# Patient Record
Sex: FEMALE | NOT HISPANIC OR LATINO | Employment: FULL TIME | URBAN - METROPOLITAN AREA
[De-identification: names, ages, dates, MRNs, and addresses within clinical notes are randomized per-mention and may not be internally consistent; named-entity substitution may affect disease eponyms.]

---

## 2017-04-14 LAB
EXTERNAL ABO GROUPING: NORMAL
EXTERNAL ANTIBODY SCREEN: NEGATIVE
EXTERNAL CHLAMYDIA SCREEN: NEGATIVE
EXTERNAL GLUCOSE TOLERANCE TEST FASTING: 105 MG/DL
EXTERNAL GONORRHEA SCREEN: NEGATIVE
EXTERNAL HEPATITIS B SURFACE ANTIGEN: NEGATIVE
EXTERNAL RH FACTOR: POSITIVE
EXTERNAL RUBELLA QUALITATIVE: NORMAL
EXTERNAL SYPHILIS RPR SCREEN: NORMAL
EXTERNAL URINE DRUG SCREEN: NEGATIVE
HIV1 AB SPEC QL IA.RAPID: NEGATIVE

## 2017-08-06 ENCOUNTER — HOSPITAL ENCOUNTER (OUTPATIENT)
Facility: HOSPITAL | Age: 22
Discharge: HOME | End: 2017-08-06
Attending: OBSTETRICS & GYNECOLOGY | Admitting: OBSTETRICS & GYNECOLOGY

## 2017-08-06 VITALS
WEIGHT: 132 LBS | HEART RATE: 106 BPM | SYSTOLIC BLOOD PRESSURE: 123 MMHG | TEMPERATURE: 98.5 F | HEIGHT: 63 IN | BODY MASS INDEX: 23.39 KG/M2 | DIASTOLIC BLOOD PRESSURE: 70 MMHG | RESPIRATION RATE: 20 BRPM

## 2017-08-06 LAB
AMPHET+METHAMPHET UR QL: NEGATIVE
AMPHETAMINES UR QL: NEGATIVE
BACTERIA UR QL AUTO: ABNORMAL /HPF
BARBITURATES UR QL SCN: NEGATIVE
BENZODIAZ UR QL SCN: NEGATIVE
BILIRUB UR QL STRIP: NEGATIVE
BUPRENORPHINE SERPL-MCNC: NEGATIVE NG/ML
CANNABINOIDS SERPL QL: NEGATIVE
CLARITY UR: ABNORMAL
COCAINE UR QL: NEGATIVE
COLOR UR: YELLOW
GLUCOSE UR STRIP-MCNC: NEGATIVE MG/DL
HGB UR QL STRIP.AUTO: NEGATIVE
HYALINE CASTS UR QL AUTO: ABNORMAL /LPF
KETONES UR QL STRIP: ABNORMAL
LEUKOCYTE ESTERASE UR QL STRIP.AUTO: NEGATIVE
METHADONE UR QL SCN: NEGATIVE
NITRITE UR QL STRIP: NEGATIVE
OPIATES UR QL: NEGATIVE
OXYCODONE UR QL SCN: NEGATIVE
PCP UR QL SCN: NEGATIVE
PH UR STRIP.AUTO: 7 [PH] (ref 5–8)
PROPOXYPH UR QL: NEGATIVE
PROT UR QL STRIP: NEGATIVE
RBC # UR: ABNORMAL /HPF
REF LAB TEST METHOD: ABNORMAL
SP GR UR STRIP: 1.01 (ref 1–1.03)
SQUAMOUS #/AREA URNS HPF: ABNORMAL /HPF
TRICYCLICS UR QL SCN: NEGATIVE
UROBILINOGEN UR QL STRIP: ABNORMAL
WBC UR QL AUTO: ABNORMAL /HPF

## 2017-08-06 PROCEDURE — 59025 FETAL NON-STRESS TEST: CPT | Performed by: OBSTETRICS & GYNECOLOGY

## 2017-08-06 PROCEDURE — G0463 HOSPITAL OUTPT CLINIC VISIT: HCPCS

## 2017-08-06 PROCEDURE — 99203 OFFICE O/P NEW LOW 30 MIN: CPT | Performed by: OBSTETRICS & GYNECOLOGY

## 2017-08-06 PROCEDURE — 81001 URINALYSIS AUTO W/SCOPE: CPT | Performed by: OBSTETRICS & GYNECOLOGY

## 2017-08-06 PROCEDURE — 80306 DRUG TEST PRSMV INSTRMNT: CPT | Performed by: OBSTETRICS & GYNECOLOGY

## 2017-08-06 PROCEDURE — 87591 N.GONORRHOEAE DNA AMP PROB: CPT | Performed by: OBSTETRICS & GYNECOLOGY

## 2017-08-06 PROCEDURE — 87491 CHLMYD TRACH DNA AMP PROBE: CPT | Performed by: OBSTETRICS & GYNECOLOGY

## 2017-08-06 PROCEDURE — 59025 FETAL NON-STRESS TEST: CPT

## 2017-08-06 PROCEDURE — 87210 SMEAR WET MOUNT SALINE/INK: CPT | Performed by: OBSTETRICS & GYNECOLOGY

## 2017-08-06 RX ORDER — FERROUS SULFATE 325(65) MG
325 TABLET ORAL
COMMUNITY
End: 2020-05-03 | Stop reason: HOSPADM

## 2017-08-06 RX ORDER — PRENATAL WITH FERROUS FUM AND FOLIC ACID 3080; 920; 120; 400; 22; 1.84; 3; 20; 10; 1; 12; 200; 27; 25; 2 [IU]/1; [IU]/1; MG/1; [IU]/1; MG/1; MG/1; MG/1; MG/1; MG/1; MG/1; UG/1; MG/1; MG/1; MG/1; MG/1
1 TABLET ORAL DAILY
COMMUNITY

## 2017-08-07 NOTE — PROGRESS NOTES
L&D Triage:    CC: abd pain  HPI: 21yo G1 @ 29 5 wks presents to L&D triage with c/o constant bilateral lower abd pain that started this evening after being subject to a family argument which stressed her out. She denies any physical confrontation/trauma. She denies vb/LOF. She admits to active fetal movement. She states the pain is like round ligament pain but more severe. She had a brief episode of chest discomfort and shortness of breath when she was stressed about the argument but it has resolved. She is currently in the process of switching her prenatal care from  to [facility]. She has an appt with Sarah Palafox tomorrow.    OB History    Para Term  AB SAB TAB Ectopic Multiple Living   1               # Outcome Date GA Lbr Malcom/2nd Weight Sex Delivery Anes PTL Lv   1 Current                 Past Medical History:   Diagnosis Date   • Endometriosis      Past Surgical History:   Procedure Laterality Date   • APPENDECTOMY     • WISDOM TOOTH EXTRACTION       Social History     Social History   • Marital status:      Spouse name: N/A   • Number of children: N/A   • Years of education: N/A     Occupational History   • Not on file.     Social History Main Topics   • Smoking status: Never Smoker   • Smokeless tobacco: Not on file   • Alcohol use No   • Drug use: No   • Sexual activity: Not on file     Other Topics Concern   • Not on file     Social History Narrative   • No narrative on file     No family history on file.     Review of Systems - History obtained from the patient  General ROS: negative for - chills or fever  Psychological ROS: negative for - physical abuse  Respiratory ROS: no cough, shortness of breath, or wheezing  Cardiovascular ROS: no chest pain or dyspnea on exertion  Gastrointestinal ROS: positive for - abdominal pain  Genito-Urinary ROS: no dysuria, trouble voiding, or hematuria  Neurological ROS: negative for - headaches or visual changes    Patient Vitals for the past 24  "hrs:   BP Temp Temp src Pulse Resp Height Weight   08/06/17 2150 150/86 - - 108 - - -   08/06/17 2148 - 98.5 °F (36.9 °C) Oral - 20 63\" (160 cm) 132 lb (59.9 kg)       Gen: NAD, WD, WN  Abd: soft, gravid, no focal tenderness  SSE: scant dc in vault. No bleeding, no pooling of fluid  SVE: cl/th/firm/floating    FHT baseline 145, mod bambi, no decels, reactive  [place]: possible very occasional ctx    Wet mount: no clue cells, trich, yeast seen    Imp: 21yo G1 @ 29 5 abd pain             Reassuring fetus  Plan: check UA, rpt BP; GC/CT swab sent to lab.    Otilia Bobo, DO    "

## 2017-08-07 NOTE — PROGRESS NOTES
Pt states her pain is subsiding now.    UA wnl    BPs normalized; initially elevated when pt writhing in bed on admission    D/C home. Keep appt with Sarah Palafox tomorrow.    Otilia Bobo, DO

## 2017-08-08 LAB
C TRACH RRNA SPEC DONR QL NAA+PROBE: NEGATIVE
N GONORRHOEA DNA SPEC QL NAA+PROBE: NEGATIVE

## 2017-09-18 ENCOUNTER — HOSPITAL ENCOUNTER (OUTPATIENT)
Facility: HOSPITAL | Age: 22
Setting detail: OBSERVATION
Discharge: HOME | End: 2017-09-18
Attending: ADVANCED PRACTICE MIDWIFE | Admitting: OBSTETRICS & GYNECOLOGY

## 2017-09-18 VITALS
WEIGHT: 158 LBS | HEIGHT: 63 IN | RESPIRATION RATE: 18 BRPM | BODY MASS INDEX: 28 KG/M2 | SYSTOLIC BLOOD PRESSURE: 134 MMHG | HEART RATE: 66 BPM | DIASTOLIC BLOOD PRESSURE: 83 MMHG | TEMPERATURE: 98 F

## 2017-09-18 PROBLEM — O13.9 GESTATIONAL HYPERTENSION: Status: ACTIVE | Noted: 2017-09-18

## 2017-09-18 LAB
ALP SERPL-CCNC: 132 U/L (ref 25–100)
ALT SERPL W P-5'-P-CCNC: 13 U/L (ref 7–40)
AST SERPL-CCNC: 17 U/L (ref 0–33)
BILIRUB SERPL-MCNC: 0.3 MG/DL (ref 0.3–1.2)
CREAT BLD-MCNC: 0.5 MG/DL (ref 0.6–1.3)
DEPRECATED RDW RBC AUTO: 47.4 FL (ref 37–54)
ERYTHROCYTE [DISTWIDTH] IN BLOOD BY AUTOMATED COUNT: 15.9 % (ref 11.3–14.5)
HCT VFR BLD AUTO: 28.5 % (ref 34.5–44)
HGB BLD-MCNC: 9.1 G/DL (ref 11.5–15.5)
LDH SERPL-CCNC: 147 U/L (ref 120–246)
MCH RBC QN AUTO: 25.9 PG (ref 27–31)
MCHC RBC AUTO-ENTMCNC: 31.9 G/DL (ref 32–36)
MCV RBC AUTO: 81.2 FL (ref 80–99)
PLATELET # BLD AUTO: 163 10*3/MM3 (ref 150–450)
PMV BLD AUTO: 11.7 FL (ref 6–12)
PROT UR STRIP-MCNC: NEGATIVE MG/DL
RBC # BLD AUTO: 3.51 10*6/MM3 (ref 3.89–5.14)
URATE SERPL-MCNC: 4.7 MG/DL (ref 3.1–7.8)
WBC NRBC COR # BLD: 6.3 10*3/MM3 (ref 3.5–10.8)

## 2017-09-18 PROCEDURE — 59025 FETAL NON-STRESS TEST: CPT

## 2017-09-18 PROCEDURE — 82247 BILIRUBIN TOTAL: CPT | Performed by: ADVANCED PRACTICE MIDWIFE

## 2017-09-18 PROCEDURE — 84075 ASSAY ALKALINE PHOSPHATASE: CPT | Performed by: ADVANCED PRACTICE MIDWIFE

## 2017-09-18 PROCEDURE — 84460 ALANINE AMINO (ALT) (SGPT): CPT | Performed by: ADVANCED PRACTICE MIDWIFE

## 2017-09-18 PROCEDURE — 82565 ASSAY OF CREATININE: CPT | Performed by: ADVANCED PRACTICE MIDWIFE

## 2017-09-18 PROCEDURE — 84550 ASSAY OF BLOOD/URIC ACID: CPT | Performed by: ADVANCED PRACTICE MIDWIFE

## 2017-09-18 PROCEDURE — G0378 HOSPITAL OBSERVATION PER HR: HCPCS

## 2017-09-18 PROCEDURE — 81002 URINALYSIS NONAUTO W/O SCOPE: CPT | Performed by: ADVANCED PRACTICE MIDWIFE

## 2017-09-18 PROCEDURE — 85027 COMPLETE CBC AUTOMATED: CPT | Performed by: ADVANCED PRACTICE MIDWIFE

## 2017-09-18 PROCEDURE — 84450 TRANSFERASE (AST) (SGOT): CPT | Performed by: ADVANCED PRACTICE MIDWIFE

## 2017-09-18 PROCEDURE — 83615 LACTATE (LD) (LDH) ENZYME: CPT | Performed by: ADVANCED PRACTICE MIDWIFE

## 2017-09-18 NOTE — H&P
ABHILASH Hammer  Antepartum  H&P    Subjective     Patient:    Presented to the office today after calling to report a BP of 145/99, increased swelling and occasional blurry vision. Denies headache or epigastric pain. Baby active, no contractions    Objective     Vital Signs Range for the last 24 hours  Temp:  [98 °F (36.7 °C)] 98 °F (36.7 °C)   Temp src: Oral   BP: (130-135)/(83-86) 130/86   Heart Rate:  [86-90] 90   Resp:  [18] 18               Weight:  [158 lb (71.7 kg)] 158 lb (71.7 kg)            Physical Exam:  General: Well developed well nourished in no acute distress   Abdomen Abdominal exam: Gravid uterus, soft, non tender, + FM.   Extremities:    Pelvic: Exam of extremities: pedal edema 2 +  DTR's +2  Deferred                               Fetal Heart Rate Assessment   Method:  EFM    Beats/min:  130 BPM   Baseline:  130   Varibility:  average   Accels:  present   Decels:  absent         Uterine Assessment   Method:  [place]   Frequency (min):  ocassional   Ctx Count in 10 min:     Duration:     Intensity: Contraction Intensity: not felt   Intensity by IUPC:     Resting Tone: Uterine Resting Tone: soft by palpation, relaxation >60 sec   Resting Tone by IUPC:     [place] Units:         Assessment/Plan     Active Problems:    Gestational hypertension        Assessment:  1.  Intrauterine pregnancy at 35w6d weeks gestation with reactive fetal status.      Plan:  1.Serial blood pressures  2. CBC, PEP and urine   3 .If BP's  and labs normal will discharge home.  Off work and follow up in office Friday  4. Plan of care has been reviewed with patient         Brigid Palafox CNM  9/18/2017  5:41 PM

## 2017-09-18 NOTE — DISCHARGE INSTR - ACTIVITY
No working this week. Take it easy at home with semi bedrest. No housecleaning, exercise, walking around stores or lesiure strolls.

## 2017-09-19 ENCOUNTER — HOSPITAL ENCOUNTER (OUTPATIENT)
Facility: HOSPITAL | Age: 22
Discharge: HOME | End: 2017-09-20
Attending: OBSTETRICS & GYNECOLOGY | Admitting: OBSTETRICS & GYNECOLOGY

## 2017-09-19 PROCEDURE — 82565 ASSAY OF CREATININE: CPT | Performed by: OBSTETRICS & GYNECOLOGY

## 2017-09-19 PROCEDURE — 86850 RBC ANTIBODY SCREEN: CPT | Performed by: OBSTETRICS & GYNECOLOGY

## 2017-09-19 PROCEDURE — 85027 COMPLETE CBC AUTOMATED: CPT | Performed by: OBSTETRICS & GYNECOLOGY

## 2017-09-19 PROCEDURE — G0463 HOSPITAL OUTPT CLINIC VISIT: HCPCS

## 2017-09-19 PROCEDURE — 82247 BILIRUBIN TOTAL: CPT | Performed by: OBSTETRICS & GYNECOLOGY

## 2017-09-19 PROCEDURE — 59025 FETAL NON-STRESS TEST: CPT

## 2017-09-19 PROCEDURE — 84550 ASSAY OF BLOOD/URIC ACID: CPT | Performed by: OBSTETRICS & GYNECOLOGY

## 2017-09-19 PROCEDURE — 84460 ALANINE AMINO (ALT) (SGPT): CPT | Performed by: OBSTETRICS & GYNECOLOGY

## 2017-09-19 PROCEDURE — 84075 ASSAY ALKALINE PHOSPHATASE: CPT | Performed by: OBSTETRICS & GYNECOLOGY

## 2017-09-19 PROCEDURE — 86901 BLOOD TYPING SEROLOGIC RH(D): CPT | Performed by: OBSTETRICS & GYNECOLOGY

## 2017-09-19 PROCEDURE — 84450 TRANSFERASE (AST) (SGOT): CPT | Performed by: OBSTETRICS & GYNECOLOGY

## 2017-09-19 PROCEDURE — 86900 BLOOD TYPING SEROLOGIC ABO: CPT | Performed by: OBSTETRICS & GYNECOLOGY

## 2017-09-19 PROCEDURE — 83615 LACTATE (LD) (LDH) ENZYME: CPT | Performed by: OBSTETRICS & GYNECOLOGY

## 2017-09-20 VITALS
SYSTOLIC BLOOD PRESSURE: 136 MMHG | HEART RATE: 72 BPM | WEIGHT: 150 LBS | HEIGHT: 63 IN | DIASTOLIC BLOOD PRESSURE: 93 MMHG | BODY MASS INDEX: 26.58 KG/M2

## 2017-09-20 LAB
ABO GROUP BLD: NORMAL
ALP SERPL-CCNC: 137 U/L (ref 25–100)
ALT SERPL W P-5'-P-CCNC: 12 U/L (ref 7–40)
AST SERPL-CCNC: 17 U/L (ref 0–33)
BILIRUB SERPL-MCNC: 0.3 MG/DL (ref 0.3–1.2)
BLD GP AB SCN SERPL QL: NEGATIVE
CREAT BLD-MCNC: 0.5 MG/DL (ref 0.6–1.3)
DEPRECATED RDW RBC AUTO: 48.2 FL (ref 37–54)
ERYTHROCYTE [DISTWIDTH] IN BLOOD BY AUTOMATED COUNT: 16 % (ref 11.3–14.5)
HCT VFR BLD AUTO: 31.1 % (ref 34.5–44)
HGB BLD-MCNC: 9.8 G/DL (ref 11.5–15.5)
LDH SERPL-CCNC: 168 U/L (ref 120–246)
MCH RBC QN AUTO: 25.9 PG (ref 27–31)
MCHC RBC AUTO-ENTMCNC: 31.5 G/DL (ref 32–36)
MCV RBC AUTO: 82.3 FL (ref 80–99)
PLATELET # BLD AUTO: 183 10*3/MM3 (ref 150–450)
PMV BLD AUTO: 12.1 FL (ref 6–12)
RBC # BLD AUTO: 3.78 10*6/MM3 (ref 3.89–5.14)
RH BLD: POSITIVE
URATE SERPL-MCNC: 4.9 MG/DL (ref 3.1–7.8)
WBC NRBC COR # BLD: 7.73 10*3/MM3 (ref 3.5–10.8)

## 2017-09-23 LAB — EXTERNAL GROUP B STREP ANTIGEN: NEGATIVE

## 2017-09-26 ENCOUNTER — TRANSCRIBE ORDERS (OUTPATIENT)
Dept: LAB | Facility: HOSPITAL | Age: 22
End: 2017-09-26

## 2017-09-26 DIAGNOSIS — O14.90: Primary | ICD-10-CM

## 2017-10-06 ENCOUNTER — LAB REQUISITION (OUTPATIENT)
Dept: LAB | Facility: HOSPITAL | Age: 22
End: 2017-10-06

## 2017-10-06 ENCOUNTER — TRANSCRIBE ORDERS (OUTPATIENT)
Dept: LAB | Facility: HOSPITAL | Age: 22
End: 2017-10-06

## 2017-10-06 DIAGNOSIS — Z34.83 PRENATAL CARE, SUBSEQUENT PREGNANCY, THIRD TRIMESTER: ICD-10-CM

## 2017-10-06 DIAGNOSIS — Z3A.38 38 WEEKS GESTATION OF PREGNANCY: Primary | ICD-10-CM

## 2017-10-06 DIAGNOSIS — Z00.00 ROUTINE GENERAL MEDICAL EXAMINATION AT A HEALTH CARE FACILITY: ICD-10-CM

## 2017-10-06 PROCEDURE — 36415 COLL VENOUS BLD VENIPUNCTURE: CPT | Performed by: ADVANCED PRACTICE MIDWIFE

## 2017-10-08 ENCOUNTER — HOSPITAL ENCOUNTER (INPATIENT)
Facility: HOSPITAL | Age: 22
LOS: 4 days | Discharge: HOME | End: 2017-10-12
Attending: ADVANCED PRACTICE MIDWIFE | Admitting: OBSTETRICS & GYNECOLOGY

## 2017-10-08 DIAGNOSIS — O13.3 PREGNANCY-INDUCED HYPERTENSION IN THIRD TRIMESTER: Primary | ICD-10-CM

## 2017-10-08 DIAGNOSIS — N71.9 ENDOMETRITIS: ICD-10-CM

## 2017-10-08 PROBLEM — Z34.90 PREGNANCY: Status: ACTIVE | Noted: 2017-10-08

## 2017-10-08 LAB
ABO GROUP BLD: NORMAL
ALP SERPL-CCNC: 150 U/L (ref 25–100)
ALT SERPL W P-5'-P-CCNC: 12 U/L (ref 7–40)
AST SERPL-CCNC: 19 U/L (ref 0–33)
BILIRUB SERPL-MCNC: 0.3 MG/DL (ref 0.3–1.2)
BLD GP AB SCN SERPL QL: NEGATIVE
CREAT BLD-MCNC: 0.6 MG/DL (ref 0.6–1.3)
DEPRECATED RDW RBC AUTO: 51.9 FL (ref 37–54)
ERYTHROCYTE [DISTWIDTH] IN BLOOD BY AUTOMATED COUNT: 17.1 % (ref 11.3–14.5)
HCT VFR BLD AUTO: 33.1 % (ref 34.5–44)
HGB BLD-MCNC: 10.4 G/DL (ref 11.5–15.5)
LDH SERPL-CCNC: 185 U/L (ref 120–246)
MCH RBC QN AUTO: 26 PG (ref 27–31)
MCHC RBC AUTO-ENTMCNC: 31.4 G/DL (ref 32–36)
MCV RBC AUTO: 82.8 FL (ref 80–99)
PLATELET # BLD AUTO: 177 10*3/MM3 (ref 150–450)
PMV BLD AUTO: 12.2 FL (ref 6–12)
RBC # BLD AUTO: 4 10*6/MM3 (ref 3.89–5.14)
RH BLD: POSITIVE
URATE SERPL-MCNC: 5.5 MG/DL (ref 3.1–7.8)
WBC NRBC COR # BLD: 7.83 10*3/MM3 (ref 3.5–10.8)

## 2017-10-08 PROCEDURE — 86923 COMPATIBILITY TEST ELECTRIC: CPT

## 2017-10-08 PROCEDURE — 86901 BLOOD TYPING SEROLOGIC RH(D): CPT | Performed by: OBSTETRICS & GYNECOLOGY

## 2017-10-08 PROCEDURE — 84450 TRANSFERASE (AST) (SGOT): CPT | Performed by: OBSTETRICS & GYNECOLOGY

## 2017-10-08 PROCEDURE — 84550 ASSAY OF BLOOD/URIC ACID: CPT | Performed by: OBSTETRICS & GYNECOLOGY

## 2017-10-08 PROCEDURE — 84460 ALANINE AMINO (ALT) (SGPT): CPT | Performed by: OBSTETRICS & GYNECOLOGY

## 2017-10-08 PROCEDURE — 59025 FETAL NON-STRESS TEST: CPT

## 2017-10-08 PROCEDURE — 86850 RBC ANTIBODY SCREEN: CPT | Performed by: OBSTETRICS & GYNECOLOGY

## 2017-10-08 PROCEDURE — 0KQM0ZZ REPAIR PERINEUM MUSCLE, OPEN APPROACH: ICD-10-PCS | Performed by: OBSTETRICS & GYNECOLOGY

## 2017-10-08 PROCEDURE — 86900 BLOOD TYPING SEROLOGIC ABO: CPT | Performed by: OBSTETRICS & GYNECOLOGY

## 2017-10-08 PROCEDURE — 82565 ASSAY OF CREATININE: CPT | Performed by: OBSTETRICS & GYNECOLOGY

## 2017-10-08 PROCEDURE — 25010000002 MAGNESIUM SULFATE-LACT RINGERS 40 GM/580ML SOLUTION: Performed by: OBSTETRICS & GYNECOLOGY

## 2017-10-08 PROCEDURE — 83615 LACTATE (LD) (LDH) ENZYME: CPT | Performed by: OBSTETRICS & GYNECOLOGY

## 2017-10-08 PROCEDURE — 85027 COMPLETE CBC AUTOMATED: CPT | Performed by: OBSTETRICS & GYNECOLOGY

## 2017-10-08 PROCEDURE — 84075 ASSAY ALKALINE PHOSPHATASE: CPT | Performed by: OBSTETRICS & GYNECOLOGY

## 2017-10-08 PROCEDURE — 25010000002 BUTORPHANOL PER 1 MG: Performed by: OBSTETRICS & GYNECOLOGY

## 2017-10-08 PROCEDURE — 99221 1ST HOSP IP/OBS SF/LOW 40: CPT | Performed by: OBSTETRICS & GYNECOLOGY

## 2017-10-08 PROCEDURE — 82247 BILIRUBIN TOTAL: CPT | Performed by: OBSTETRICS & GYNECOLOGY

## 2017-10-08 RX ORDER — SODIUM CHLORIDE, SODIUM LACTATE, POTASSIUM CHLORIDE, CALCIUM CHLORIDE 600; 310; 30; 20 MG/100ML; MG/100ML; MG/100ML; MG/100ML
125 INJECTION, SOLUTION INTRAVENOUS CONTINUOUS
Status: DISCONTINUED | OUTPATIENT
Start: 2017-10-08 | End: 2017-10-08

## 2017-10-08 RX ORDER — MAGNESIUM SULF/RINGERS LACTATE 40 G/500ML
2 PLASTIC BAG, INJECTION (ML) INTRAVENOUS CONTINUOUS
Status: DISPENSED | OUTPATIENT
Start: 2017-10-08 | End: 2017-10-09

## 2017-10-08 RX ORDER — SODIUM CHLORIDE, SODIUM LACTATE, POTASSIUM CHLORIDE, CALCIUM CHLORIDE 600; 310; 30; 20 MG/100ML; MG/100ML; MG/100ML; MG/100ML
96 INJECTION, SOLUTION INTRAVENOUS CONTINUOUS
Status: DISCONTINUED | OUTPATIENT
Start: 2017-10-08 | End: 2017-10-12 | Stop reason: HOSPADM

## 2017-10-08 RX ORDER — PROMETHAZINE HYDROCHLORIDE 25 MG/ML
12.5 INJECTION, SOLUTION INTRAMUSCULAR; INTRAVENOUS EVERY 6 HOURS PRN
Status: DISCONTINUED | OUTPATIENT
Start: 2017-10-08 | End: 2017-10-08 | Stop reason: SDUPTHER

## 2017-10-08 RX ORDER — ACETAMINOPHEN 325 MG/1
650 TABLET ORAL EVERY 4 HOURS PRN
Status: DISCONTINUED | OUTPATIENT
Start: 2017-10-08 | End: 2017-10-08 | Stop reason: SDUPTHER

## 2017-10-08 RX ORDER — LIDOCAINE HYDROCHLORIDE 10 MG/ML
5 INJECTION, SOLUTION INFILTRATION; PERINEURAL AS NEEDED
Status: DISCONTINUED | OUTPATIENT
Start: 2017-10-08 | End: 2017-10-09

## 2017-10-08 RX ORDER — PROMETHAZINE HYDROCHLORIDE 12.5 MG/1
12.5 TABLET ORAL EVERY 6 HOURS PRN
Status: DISCONTINUED | OUTPATIENT
Start: 2017-10-08 | End: 2017-10-09 | Stop reason: HOSPADM

## 2017-10-08 RX ORDER — DIPHENHYDRAMINE HYDROCHLORIDE 50 MG/ML
25 INJECTION INTRAMUSCULAR; INTRAVENOUS NIGHTLY PRN
Status: DISCONTINUED | OUTPATIENT
Start: 2017-10-08 | End: 2017-10-08 | Stop reason: SDUPTHER

## 2017-10-08 RX ORDER — ACETAMINOPHEN 325 MG/1
650 TABLET ORAL EVERY 4 HOURS PRN
Status: DISCONTINUED | OUTPATIENT
Start: 2017-10-08 | End: 2017-10-12 | Stop reason: HOSPADM

## 2017-10-08 RX ORDER — PROMETHAZINE HYDROCHLORIDE 25 MG/ML
12.5 INJECTION, SOLUTION INTRAMUSCULAR; INTRAVENOUS EVERY 6 HOURS PRN
Status: DISCONTINUED | OUTPATIENT
Start: 2017-10-08 | End: 2017-10-09 | Stop reason: HOSPADM

## 2017-10-08 RX ORDER — PROMETHAZINE HYDROCHLORIDE 12.5 MG/1
12.5 SUPPOSITORY RECTAL EVERY 6 HOURS PRN
Status: DISCONTINUED | OUTPATIENT
Start: 2017-10-08 | End: 2017-10-09 | Stop reason: HOSPADM

## 2017-10-08 RX ORDER — FAMOTIDINE 10 MG/ML
20 INJECTION, SOLUTION INTRAVENOUS EVERY 12 HOURS SCHEDULED
Status: DISCONTINUED | OUTPATIENT
Start: 2017-10-08 | End: 2017-10-09

## 2017-10-08 RX ORDER — PROMETHAZINE HYDROCHLORIDE 12.5 MG/1
12.5 TABLET ORAL EVERY 6 HOURS PRN
Status: DISCONTINUED | OUTPATIENT
Start: 2017-10-08 | End: 2017-10-08 | Stop reason: SDUPTHER

## 2017-10-08 RX ORDER — IBUPROFEN 600 MG/1
600 TABLET ORAL EVERY 6 HOURS PRN
Status: DISCONTINUED | OUTPATIENT
Start: 2017-10-08 | End: 2017-10-09 | Stop reason: HOSPADM

## 2017-10-08 RX ORDER — MISOPROSTOL 200 UG/1
800 TABLET ORAL AS NEEDED
Status: DISCONTINUED | OUTPATIENT
Start: 2017-10-08 | End: 2017-10-09 | Stop reason: HOSPADM

## 2017-10-08 RX ORDER — LABETALOL HYDROCHLORIDE 5 MG/ML
20-80 INJECTION, SOLUTION INTRAVENOUS
Status: DISPENSED | OUTPATIENT
Start: 2017-10-08 | End: 2017-10-09

## 2017-10-08 RX ORDER — CARBOPROST TROMETHAMINE 250 UG/ML
250 INJECTION, SOLUTION INTRAMUSCULAR AS NEEDED
Status: DISCONTINUED | OUTPATIENT
Start: 2017-10-08 | End: 2017-10-09 | Stop reason: HOSPADM

## 2017-10-08 RX ORDER — ZOLPIDEM TARTRATE 5 MG/1
5 TABLET ORAL NIGHTLY PRN
Status: DISCONTINUED | OUTPATIENT
Start: 2017-10-08 | End: 2017-10-09 | Stop reason: HOSPADM

## 2017-10-08 RX ORDER — METHYLERGONOVINE MALEATE 0.2 MG/ML
200 INJECTION INTRAVENOUS ONCE AS NEEDED
Status: DISCONTINUED | OUTPATIENT
Start: 2017-10-08 | End: 2017-10-09 | Stop reason: HOSPADM

## 2017-10-08 RX ORDER — MORPHINE SULFATE 4 MG/ML
2 INJECTION, SOLUTION INTRAMUSCULAR; INTRAVENOUS
Status: DISCONTINUED | OUTPATIENT
Start: 2017-10-08 | End: 2017-10-09 | Stop reason: HOSPADM

## 2017-10-08 RX ORDER — PROMETHAZINE HYDROCHLORIDE 12.5 MG/1
12.5 SUPPOSITORY RECTAL EVERY 6 HOURS PRN
Status: DISCONTINUED | OUTPATIENT
Start: 2017-10-08 | End: 2017-10-08 | Stop reason: SDUPTHER

## 2017-10-08 RX ORDER — AMOXICILLIN 500 MG/1
500 CAPSULE ORAL 3 TIMES DAILY
Status: ON HOLD | COMMUNITY
Start: 2017-10-06 | End: 2017-10-12

## 2017-10-08 RX ORDER — OXYTOCIN/RINGER'S LACTATE 30/500 ML
2-24 PLASTIC BAG, INJECTION (ML) INTRAVENOUS
Status: DISCONTINUED | OUTPATIENT
Start: 2017-10-08 | End: 2017-10-09

## 2017-10-08 RX ORDER — OXYCODONE HYDROCHLORIDE AND ACETAMINOPHEN 5; 325 MG/1; MG/1
2 TABLET ORAL EVERY 4 HOURS PRN
Status: DISCONTINUED | OUTPATIENT
Start: 2017-10-08 | End: 2017-10-09 | Stop reason: HOSPADM

## 2017-10-08 RX ORDER — DIPHENHYDRAMINE HYDROCHLORIDE 50 MG/ML
25 INJECTION INTRAMUSCULAR; INTRAVENOUS NIGHTLY PRN
Status: DISCONTINUED | OUTPATIENT
Start: 2017-10-08 | End: 2017-10-09 | Stop reason: HOSPADM

## 2017-10-08 RX ORDER — DIPHENHYDRAMINE HCL 25 MG
25 CAPSULE ORAL NIGHTLY PRN
Status: DISCONTINUED | OUTPATIENT
Start: 2017-10-08 | End: 2017-10-08 | Stop reason: SDUPTHER

## 2017-10-08 RX ORDER — ZOLPIDEM TARTRATE 5 MG/1
5 TABLET ORAL NIGHTLY PRN
Status: DISCONTINUED | OUTPATIENT
Start: 2017-10-08 | End: 2017-10-08 | Stop reason: SDUPTHER

## 2017-10-08 RX ORDER — OXYCODONE HYDROCHLORIDE AND ACETAMINOPHEN 5; 325 MG/1; MG/1
1 TABLET ORAL EVERY 4 HOURS PRN
Status: DISCONTINUED | OUTPATIENT
Start: 2017-10-08 | End: 2017-10-09 | Stop reason: HOSPADM

## 2017-10-08 RX ORDER — SODIUM CHLORIDE 0.9 % (FLUSH) 0.9 %
1-10 SYRINGE (ML) INJECTION AS NEEDED
Status: DISCONTINUED | OUTPATIENT
Start: 2017-10-08 | End: 2017-10-09

## 2017-10-08 RX ORDER — DIPHENHYDRAMINE HCL 25 MG
25 CAPSULE ORAL NIGHTLY PRN
Status: DISCONTINUED | OUTPATIENT
Start: 2017-10-08 | End: 2017-10-09 | Stop reason: HOSPADM

## 2017-10-08 RX ADMIN — Medication 2 MILLI-UNITS/MIN: at 13:01

## 2017-10-08 RX ADMIN — BUTORPHANOL TARTRATE 2 MG: 2 INJECTION, SOLUTION INTRAMUSCULAR; INTRAVENOUS at 15:10

## 2017-10-08 RX ADMIN — BUTORPHANOL TARTRATE 2 MG: 2 INJECTION, SOLUTION INTRAMUSCULAR; INTRAVENOUS at 11:34

## 2017-10-08 RX ADMIN — FAMOTIDINE 20 MG: 10 INJECTION, SOLUTION INTRAVENOUS at 05:16

## 2017-10-08 RX ADMIN — LIDOCAINE HYDROCHLORIDE 5 ML: 10 INJECTION, SOLUTION INFILTRATION; PERINEURAL at 21:20

## 2017-10-08 RX ADMIN — BUTORPHANOL TARTRATE 1 MG: 2 INJECTION, SOLUTION INTRAMUSCULAR; INTRAVENOUS at 06:54

## 2017-10-08 RX ADMIN — LABETALOL HYDROCHLORIDE 20 MG: 5 INJECTION INTRAVENOUS at 11:42

## 2017-10-08 RX ADMIN — LABETALOL HYDROCHLORIDE 20 MG: 5 INJECTION INTRAVENOUS at 13:53

## 2017-10-08 RX ADMIN — ACETAMINOPHEN 650 MG: 325 TABLET, FILM COATED ORAL at 22:46

## 2017-10-08 RX ADMIN — MISOPROSTOL 800 MCG: 200 TABLET ORAL at 21:25

## 2017-10-08 RX ADMIN — SODIUM CHLORIDE, POTASSIUM CHLORIDE, SODIUM LACTATE AND CALCIUM CHLORIDE 125 ML/HR: 600; 310; 30; 20 INJECTION, SOLUTION INTRAVENOUS at 11:24

## 2017-10-08 RX ADMIN — SODIUM CHLORIDE, POTASSIUM CHLORIDE, SODIUM LACTATE AND CALCIUM CHLORIDE 125 ML/HR: 600; 310; 30; 20 INJECTION, SOLUTION INTRAVENOUS at 04:00

## 2017-10-08 RX ADMIN — Medication 2 G/HR: at 08:00

## 2017-10-08 RX ADMIN — LABETALOL HYDROCHLORIDE 40 MG: 5 INJECTION INTRAVENOUS at 14:17

## 2017-10-08 NOTE — PROGRESS NOTES
"10/08/17  6:35 PM  Sai Hays    SUBJECTIVE: feeling pushy    ASSESSMENTS:       /77  Pulse 89  Temp 98.3 °F (36.8 °C) (Oral)   Resp 18  Ht 63\" (160 cm)  Wt 158 lb (71.7 kg)  Breastfeeding? Yes  BMI 27.99 kg/m2    Fetal Heart Rate Assessment   Method: Fetal HR Assessment Method: external   Beats/min: Fetal HR (Beats/Min): 115   Baseline: Fetal HR Baseline: normal range (110-160 bpm)   Varibility: Fetal HR Variability: moderate (amplitude range 6 to 25 bpm)   Accels: Fetal HR Accelerations: greater than/equal to10 bpm (32 wks gest or less), lasts at least 10 seconds (32 wks gest or less)   Decels: Fetal HR Decelerations: variable   Tracing Category:       Uterine Assessment   Method: Method: TOCO (external toco transducer)   Frequency (min): Contraction Frequency (min): 3-4   Ctx Count in 10 min:     Duration: Contraction Duration (sec): 60-80   Intensity: Contraction Intensity: strong by palpation   Intensity by IUPC:     Resting Tone: Uterine Resting Tone: soft by palpation, relaxation >60 sec   Resting Tone by IUPC:       Presentation: vetex   Cervix: Exam by: Method: sterile exam per RN   Dilation: Dilation: 9   Effacement: Cervical Effacement: 100%   Station: Station: 1     SROM, clear       PLAN:  1. Magnesium for preeclampsia w/ severe features.  BPs well controlled.  Has required labetalol intermittently.  2.  Pitocin augmentation.    3.  Begin pushing once complete    Madisyn Reed MD  6:35 PM  10/08/17       "

## 2017-10-08 NOTE — PROGRESS NOTES
"10/08/17  12:46 PM  Sai Hays    SUBJECTIVE: feels tired from magnesium.  Coping w/ contractions well.  S/p stadol.     ASSESSMENTS:       /92  Pulse 86  Temp 97.5 °F (36.4 °C) (Oral)   Resp 18  Ht 63\" (160 cm)  Wt 158 lb (71.7 kg)  Breastfeeding? Yes  BMI 27.99 kg/m2    Fetal Heart Rate Assessment   Method: Fetal HR Assessment Method: external   Beats/min: Fetal HR (Beats/Min): 110   Baseline: Fetal HR Baseline: normal range (110-160 bpm)   Varibility: Fetal HR Variability: minimal (detectable: amplitude less than or equal to 5 bpm)   Accels: Fetal HR Accelerations: absent   Decels: Fetal HR Decelerations: absent   Tracing Category:       Uterine Assessment   Method: Method: TOCO (external toco transducer)   Frequency (min): Contraction Frequency (min): 3-5   Ctx Count in 10 min:     Duration: Contraction Duration (sec): 60-80   Intensity: Contraction Intensity: moderate by palpation   Intensity by IUPC:     Resting Tone: Uterine Resting Tone: soft by palpation, relaxation >60 sec   Resting Tone by IUPC:       Presentation: vetex   Cervix: Exam by: Method: sterile exam per RN   Dilation: Dilation: 3   Effacement: Cervical Effacement: 90%   Station: Station: -1     SROM, clear       PLAN:  1. Magnesium for preeclampsia w/ severe features  2.  Pitocin augmentation prn.    3.  Epidural prn    Madisyn Reed MD  12:46 PM  10/08/17       "

## 2017-10-08 NOTE — PLAN OF CARE
Problem: Labor (Cervical Ripen, Induct, Augment) (Adult,Obstetrics,Pediatric)  Goal: Signs and Symptoms of Listed Potential Problems Will be Absent or Manageable (Labor)  Outcome: Ongoing (interventions implemented as appropriate)    10/08/17 0808   Labor (Cervical Ripen, Induct, Augment)   Problems Assessed (Labor) all   Problems Present (Labor) pain

## 2017-10-08 NOTE — H&P
ABHILASH Hammer  Obstetric History and Physical    Chief Complaint   Patient presents with   • Contractions       Subjective     Patient is a 22 y.o. female  currently at 38w5d, who presents with uncomfortable contractions every 2-3 minutes.  She denies leaking and vaginal bleeding.  +FM.  She is noted to have elevated BPs, but denies having any pre-eclampsia related symptoms.  Her PEP is normal.          Prenatal Information:  Prenatal Results         Initial Prenatal Labs Ref. Range Date Time   Hemoglobin       Hematocrit       Platelets  177 10*3/mm3 150 - 450 10*3/mm3 10/08/17 035   Rubella IgG       Hepatitis B SAg ^ Negative   17    Hepatitis C Ab       RPR ^ Non-Reactive   17    ABO  A   10/08/17 0357   Rh  Positive   10/08/17 0357   Antibody Screen ^ Negative   17    HIV       Urine Culture       Gonorrhea  Negative  Negative 17   Chlamydia  Negative  Negative 17   TSH       2nd and 3rd Trimester Ref. Range Date Time   Hemoglobin (repeated)  10.4 g/dL (L) 11.5 - 15.5 g/dL 10/08/17 0357   Hematocrit (repeated)  33.1 % (L) 34.5 - 44.0 % 10/08/17 0357   GCT       Antibody Screen (repeated)  Negative   10/08/17 0357   GTT Fasting       GTT 1 Hr       GTT 2 Hr       GTT 3 Hr       Group B Strep       Drug Screening Ref. Range Date Time   Amphetamine Screen  Negative  Negative 17   Barbiturate Screen  Negative  Negative 17   Benzodiazepine Screen  Negative  Negative 17   Methadone Screen  Negative  Negative 17   Phencyclidine Screen  Negative  Negative 17   Opiates Screen  Negative  Negative 17   THC Screen  Negative  Negative 17   Cocaine Screen  Negative  Negative 17   Propoxyphene Screen  Negative  Negative 17   Buprenorphine Screen  Negative  Negative 17   Methamphetamine Screen  Negative  Negative 17   Oxycodone Screen  Negative  Negative  17 2235   Tryicyclic Antidepressants Screen  Negative  Negative 17 2235   Other (Risk screening) Ref. Range Date Time   Varicella IgG       Parvovirus IgG       CMV IgG       Cystic Fibrosis       Hemoglobin electrophoresis       NIPT       MSAFP-4       AFP (for NTD only)              Legend: ^: Historical            View all results for this pregnancy        External Prenatal Results         Pregnancy Outside Results - these were transcribed from office records.  See scanned records for details. Date Time   Hgb      Hct      ABO ^ A  17    Rh ^ Positive  17    Antibody Screen ^ Negative  17    Glucose Fasting GTT ^ 105 mg/dL 17    Glucose Tolerance Test 1 hour      Glucose Tolerance Test 3 hour      Gonorrhea (discrete) ^ Negative  17    Chlamydia (discrete) ^ Negative  17    RPR ^ Non-Reactive  17    VDRL      Syphillis Antibody      Rubella ^ Non-Immune  17    HBsAg ^ Negative  17    Herpes Simplex Virus PCR      Herpes Simplex VIrus Culture      HIV ^ Negative  17    Hep C RNA Quant PCR      Hep C Antibody      Urine Drug Screen ^ negative  17    AFP      Group B Strep      GBS Susceptibility to Clindamycin      GBS Susceptibility to Eythromycin      Fetal Fibronectin      Genetic Testing, Maternal Blood             Legend: ^: Historical           Past OB History:     Obstetric History       T0      L0     SAB0   TAB0   Ectopic0   Multiple0   Live Births0       # Outcome Date GA Lbr Malcom/2nd Weight Sex Delivery Anes PTL Lv   1 Current                   Past Medical History: Past Medical History:   Diagnosis Date   • Endometriosis    • Gestational hypertension     No medications @ this time      Past Surgical History Past Surgical History:   Procedure Laterality Date   • APPENDECTOMY     • EXPLORATORY LAPAROTOMY     • WISDOM TOOTH EXTRACTION        Family History: No family history on file.   Social History:  reports that  she has never smoked. She has never used smokeless tobacco.   reports that she does not drink alcohol.   reports that she does not use illicit drugs.        Review of Systems   Reviewed in EPIC      Objective     Vital Signs Range for the last 24 hours  Temperature: Temp:  [98.1 °F (36.7 °C)] 98.1 °F (36.7 °C)   Temp Source: Temp src: Oral   BP: BP: (168-179)/() 179/97   Pulse: Heart Rate:  [65-77] 77   Respirations: Resp:  [18] 18   SPO2:     O2 Amount (l/min):     O2 Devices     Weight: Weight:  [158 lb (71.7 kg)] 158 lb (71.7 kg)     Physical Examination: General appearance - oriented to person, place, and time  Chest - no tachypnea, retractions or cyanosis  Heart - normal rate and regular rhythm  Abdomen - soft, nontender, nondistended, no masses or organomegaly  bowel sounds normal  Extremities - non tender        Cervix: Exam by: Method: sterile exam per RN   Dilation: Dilation: 1   Effacement: Cervical Effacement: 70%   Station: Station: -2     Fetal Heart Rate Assessment   Method: Fetal HR Assessment Method: external   Beats/min: Fetal HR (Beats/Min): 125   Baseline: Fetal HR Baseline: normal range (110-160 bpm)   Varibility: Fetal HR Variability: moderate (amplitude range 6 to 25 bpm)   Accels: Fetal HR Accelerations: greater than/equal to 15 bpm, lasting at least 15 seconds   Decels: Fetal HR Decelerations: absent   Tracing Category:       Uterine Assessment   Method: Method: TOCO (external toco transducer)   Frequency (min): Contraction Frequency (min): 2-4   Ctx Count in 10 min:     Duration: Contraction Duration (sec):    Intensity: Contraction Intensity: strong by palpation   Intensity by IUPC:     Resting Tone: Uterine Resting Tone: soft by palpation, relaxation >60 sec   Resting Tone by IUPC:     [place] Units:       Laboratory Results:   Lab Results   Component Value Date     10/08/2017    HGB 10.4 (L) 10/08/2017    HCT 33.1 (L) 10/08/2017    WBC 7.83 10/08/2017     Lab Results    Component Value Date    HGB 10.4 (L) 10/08/2017     10/08/2017    AST 19 10/08/2017    ALT 12 10/08/2017     10/08/2017    URICACID 5.5 10/08/2017    CREATININE 0.60 10/08/2017         Assessment/Plan     Active Problems:    Pregnancy      Assessment & Plan    Assessment:  1.  Intrauterine pregnancy at 38w5d weeks gestation with reassuring fetal status.    2.  Gestation hypertension  3.  Early labour  Plan:  1. Admit  2. CBC, PEP, IV  3. Hold MgSO4 for now, but low threshold for starting if BPs are severe range consistantly  4. Augment as needed.  5. Discussed with Dr. Derek Bal MD  10/8/2017  5:11 AM

## 2017-10-08 NOTE — PLAN OF CARE
Problem: Hypertensive Disorders in Pregnancy (Adult,Obstetrics,Pediatric)  Goal: Signs and Symptoms of Listed Potential Problems Will be Absent or Manageable (Hypertensive Disorders in Pregnancy)  Outcome: Ongoing (interventions implemented as appropriate)    10/08/17 0809   Hypertensive Disorders in Pregnancy   Problems Assessed (Hypertensive Disorders in Pregnancy) all   Problems Present (Hypertensive Disorders in Pregnancy) other (see comments)  (Magnesium added to prevent seizure)

## 2017-10-09 ENCOUNTER — ANESTHESIA EVENT (OUTPATIENT)
Dept: LABOR AND DELIVERY | Facility: HOSPITAL | Age: 22
End: 2017-10-09

## 2017-10-09 ENCOUNTER — ANESTHESIA (OUTPATIENT)
Dept: LABOR AND DELIVERY | Facility: HOSPITAL | Age: 22
End: 2017-10-09

## 2017-10-09 LAB
APTT PPP: 27.6 SECONDS (ref 24–31)
BASOPHILS # BLD AUTO: 0.01 10*3/MM3 (ref 0–0.2)
BASOPHILS NFR BLD AUTO: 0.1 % (ref 0–1)
DEPRECATED RDW RBC AUTO: 49.8 FL (ref 37–54)
DEPRECATED RDW RBC AUTO: 52.2 FL (ref 37–54)
EOSINOPHIL # BLD AUTO: 0.01 10*3/MM3 (ref 0–0.3)
EOSINOPHIL NFR BLD AUTO: 0.1 % (ref 0–3)
ERYTHROCYTE [DISTWIDTH] IN BLOOD BY AUTOMATED COUNT: 16.8 % (ref 11.3–14.5)
ERYTHROCYTE [DISTWIDTH] IN BLOOD BY AUTOMATED COUNT: 17.5 % (ref 11.3–14.5)
FIBRINOGEN PPP-MCNC: 339 MG/DL (ref 200–400)
HCT VFR BLD AUTO: 24.4 % (ref 34.5–44)
HCT VFR BLD AUTO: 24.7 % (ref 34.5–44)
HGB BLD-MCNC: 7.9 G/DL (ref 11.5–15.5)
HGB BLD-MCNC: 8.2 G/DL (ref 11.5–15.5)
IMM GRANULOCYTES # BLD: 0.03 10*3/MM3 (ref 0–0.03)
IMM GRANULOCYTES NFR BLD: 0.2 % (ref 0–0.6)
INR PPP: 0.91
LYMPHOCYTES # BLD AUTO: 1.87 10*3/MM3 (ref 0.6–4.8)
LYMPHOCYTES NFR BLD AUTO: 13.8 % (ref 24–44)
MCH RBC QN AUTO: 26.3 PG (ref 27–31)
MCH RBC QN AUTO: 27.2 PG (ref 27–31)
MCHC RBC AUTO-ENTMCNC: 32.4 G/DL (ref 32–36)
MCHC RBC AUTO-ENTMCNC: 33.2 G/DL (ref 32–36)
MCV RBC AUTO: 81.3 FL (ref 80–99)
MCV RBC AUTO: 82.1 FL (ref 80–99)
MONOCYTES # BLD AUTO: 0.78 10*3/MM3 (ref 0–1)
MONOCYTES NFR BLD AUTO: 5.7 % (ref 0–12)
NEUTROPHILS # BLD AUTO: 10.9 10*3/MM3 (ref 1.5–8.3)
NEUTROPHILS NFR BLD AUTO: 80.1 % (ref 41–71)
PLATELET # BLD AUTO: 161 10*3/MM3 (ref 150–450)
PLATELET # BLD AUTO: 191 10*3/MM3 (ref 150–450)
PMV BLD AUTO: 11.3 FL (ref 6–12)
PMV BLD AUTO: 11.5 FL (ref 6–12)
PROTHROMBIN TIME: 9.9 SECONDS (ref 9.6–11.5)
RBC # BLD AUTO: 3 10*6/MM3 (ref 3.89–5.14)
RBC # BLD AUTO: 3.01 10*6/MM3 (ref 3.89–5.14)
WBC NRBC COR # BLD: 13.6 10*3/MM3 (ref 3.5–10.8)
WBC NRBC COR # BLD: 19.49 10*3/MM3 (ref 3.5–10.8)

## 2017-10-09 PROCEDURE — 85027 COMPLETE CBC AUTOMATED: CPT | Performed by: OBSTETRICS & GYNECOLOGY

## 2017-10-09 PROCEDURE — 25010000002 GENTAMICIN PER 80 MG

## 2017-10-09 PROCEDURE — 25010000002 FENTANYL CITRATE (PF) 100 MCG/2ML SOLUTION: Performed by: ANESTHESIOLOGY

## 2017-10-09 PROCEDURE — 25010000002 MAGNESIUM SULFATE-LACT RINGERS 40 GM/580ML SOLUTION: Performed by: OBSTETRICS & GYNECOLOGY

## 2017-10-09 PROCEDURE — 85025 COMPLETE CBC W/AUTO DIFF WBC: CPT | Performed by: OBSTETRICS & GYNECOLOGY

## 2017-10-09 PROCEDURE — 86900 BLOOD TYPING SEROLOGIC ABO: CPT

## 2017-10-09 PROCEDURE — 10D17Z9 MANUAL EXTRACTION OF PRODUCTS OF CONCEPTION, RETAINED, VIA NATURAL OR ARTIFICIAL OPENING: ICD-10-PCS | Performed by: OBSTETRICS & GYNECOLOGY

## 2017-10-09 PROCEDURE — 85610 PROTHROMBIN TIME: CPT | Performed by: OBSTETRICS & GYNECOLOGY

## 2017-10-09 PROCEDURE — 85384 FIBRINOGEN ACTIVITY: CPT | Performed by: OBSTETRICS & GYNECOLOGY

## 2017-10-09 PROCEDURE — 85730 THROMBOPLASTIN TIME PARTIAL: CPT | Performed by: OBSTETRICS & GYNECOLOGY

## 2017-10-09 PROCEDURE — P9016 RBC LEUKOCYTES REDUCED: HCPCS

## 2017-10-09 PROCEDURE — 25010000002 MIDAZOLAM PER 1 MG: Performed by: ANESTHESIOLOGY

## 2017-10-09 PROCEDURE — 25010000003 CEFAZOLIN IN DEXTROSE 2-4 GM/100ML-% SOLUTION: Performed by: OBSTETRICS & GYNECOLOGY

## 2017-10-09 PROCEDURE — 59025 FETAL NON-STRESS TEST: CPT

## 2017-10-09 PROCEDURE — 36430 TRANSFUSION BLD/BLD COMPNT: CPT

## 2017-10-09 RX ORDER — BUPIVACAINE HYDROCHLORIDE 7.5 MG/ML
INJECTION, SOLUTION INTRASPINAL AS NEEDED
Status: DISCONTINUED | OUTPATIENT
Start: 2017-10-09 | End: 2017-10-09 | Stop reason: SURG

## 2017-10-09 RX ORDER — LANOLIN 100 %
OINTMENT (GRAM) TOPICAL AS NEEDED
Status: DISCONTINUED | OUTPATIENT
Start: 2017-10-09 | End: 2017-10-12 | Stop reason: HOSPADM

## 2017-10-09 RX ORDER — IBUPROFEN 600 MG/1
600 TABLET ORAL EVERY 6 HOURS PRN
Status: DISCONTINUED | OUTPATIENT
Start: 2017-10-09 | End: 2017-10-12 | Stop reason: HOSPADM

## 2017-10-09 RX ORDER — AMOXICILLIN 250 MG/1
500 CAPSULE ORAL 3 TIMES DAILY
Status: DISCONTINUED | OUTPATIENT
Start: 2017-10-09 | End: 2017-10-12 | Stop reason: HOSPADM

## 2017-10-09 RX ORDER — HYDROCODONE BITARTRATE AND ACETAMINOPHEN 5; 325 MG/1; MG/1
1 TABLET ORAL EVERY 4 HOURS PRN
Status: DISCONTINUED | OUTPATIENT
Start: 2017-10-09 | End: 2017-10-12 | Stop reason: HOSPADM

## 2017-10-09 RX ORDER — PRENATAL VIT/IRON FUM/FOLIC AC 27MG-0.8MG
1 TABLET ORAL DAILY
Status: DISCONTINUED | OUTPATIENT
Start: 2017-10-09 | End: 2017-10-12 | Stop reason: HOSPADM

## 2017-10-09 RX ORDER — TRISODIUM CITRATE DIHYDRATE AND CITRIC ACID MONOHYDRATE 500; 334 MG/5ML; MG/5ML
30 SOLUTION ORAL ONCE
Status: COMPLETED | OUTPATIENT
Start: 2017-10-09 | End: 2017-10-09

## 2017-10-09 RX ORDER — ONDANSETRON 4 MG/1
4 TABLET, FILM COATED ORAL EVERY 6 HOURS PRN
Status: DISCONTINUED | OUTPATIENT
Start: 2017-10-09 | End: 2017-10-12 | Stop reason: HOSPADM

## 2017-10-09 RX ORDER — CALCIUM CARBONATE 200(500)MG
2 TABLET,CHEWABLE ORAL 3 TIMES DAILY PRN
Status: DISCONTINUED | OUTPATIENT
Start: 2017-10-09 | End: 2017-10-12 | Stop reason: HOSPADM

## 2017-10-09 RX ORDER — GENTAMICIN SULFATE 100 MG/100ML
2 INJECTION, SOLUTION INTRAVENOUS ONCE
Status: COMPLETED | OUTPATIENT
Start: 2017-10-09 | End: 2017-10-09

## 2017-10-09 RX ORDER — GENTAMICIN SULFATE 80 MG/100ML
1.5 INJECTION, SOLUTION INTRAVENOUS EVERY 8 HOURS
Status: DISCONTINUED | OUTPATIENT
Start: 2017-10-09 | End: 2017-10-10

## 2017-10-09 RX ORDER — DOCUSATE SODIUM 100 MG/1
100 CAPSULE, LIQUID FILLED ORAL 2 TIMES DAILY PRN
Status: DISCONTINUED | OUTPATIENT
Start: 2017-10-09 | End: 2017-10-12 | Stop reason: HOSPADM

## 2017-10-09 RX ORDER — LABETALOL 200 MG/1
100 TABLET, FILM COATED ORAL 3 TIMES DAILY
Status: DISCONTINUED | OUTPATIENT
Start: 2017-10-09 | End: 2017-10-11

## 2017-10-09 RX ORDER — FERROUS SULFATE 325(65) MG
325 TABLET ORAL 2 TIMES DAILY WITH MEALS
Status: DISCONTINUED | OUTPATIENT
Start: 2017-10-09 | End: 2017-10-12 | Stop reason: HOSPADM

## 2017-10-09 RX ORDER — FENTANYL CITRATE 50 UG/ML
INJECTION, SOLUTION INTRAMUSCULAR; INTRAVENOUS AS NEEDED
Status: DISCONTINUED | OUTPATIENT
Start: 2017-10-09 | End: 2017-10-09 | Stop reason: SURG

## 2017-10-09 RX ORDER — ONDANSETRON 2 MG/ML
4 INJECTION INTRAMUSCULAR; INTRAVENOUS EVERY 6 HOURS PRN
Status: DISCONTINUED | OUTPATIENT
Start: 2017-10-09 | End: 2017-10-12 | Stop reason: HOSPADM

## 2017-10-09 RX ORDER — MIDAZOLAM HYDROCHLORIDE 1 MG/ML
INJECTION INTRAMUSCULAR; INTRAVENOUS AS NEEDED
Status: DISCONTINUED | OUTPATIENT
Start: 2017-10-09 | End: 2017-10-09 | Stop reason: SURG

## 2017-10-09 RX ORDER — CEFAZOLIN SODIUM 2 G/100ML
2 INJECTION, SOLUTION INTRAVENOUS ONCE
Status: COMPLETED | OUTPATIENT
Start: 2017-10-09 | End: 2017-10-09

## 2017-10-09 RX ORDER — SODIUM CHLORIDE 0.9 % (FLUSH) 0.9 %
1-10 SYRINGE (ML) INJECTION AS NEEDED
Status: DISCONTINUED | OUTPATIENT
Start: 2017-10-09 | End: 2017-10-12 | Stop reason: HOSPADM

## 2017-10-09 RX ORDER — CLINDAMYCIN PHOSPHATE 900 MG/50ML
900 INJECTION INTRAVENOUS EVERY 8 HOURS
Status: DISCONTINUED | OUTPATIENT
Start: 2017-10-09 | End: 2017-10-10

## 2017-10-09 RX ORDER — BISACODYL 10 MG
10 SUPPOSITORY, RECTAL RECTAL DAILY PRN
Status: DISCONTINUED | OUTPATIENT
Start: 2017-10-09 | End: 2017-10-12 | Stop reason: HOSPADM

## 2017-10-09 RX ADMIN — OXYCODONE AND ACETAMINOPHEN 1 TABLET: 5; 325 TABLET ORAL at 18:32

## 2017-10-09 RX ADMIN — FAMOTIDINE 20 MG: 10 INJECTION, SOLUTION INTRAVENOUS at 09:14

## 2017-10-09 RX ADMIN — GENTAMICIN SULFATE 80 MG: 80 INJECTION, SOLUTION INTRAVENOUS at 13:42

## 2017-10-09 RX ADMIN — FENTANYL CITRATE 20 MCG: 50 INJECTION, SOLUTION INTRAMUSCULAR; INTRAVENOUS at 01:38

## 2017-10-09 RX ADMIN — AMOXICILLIN 500 MG: 250 CAPSULE ORAL at 16:30

## 2017-10-09 RX ADMIN — CARBOPROST TROMETHAMINE 250 MCG: 250 INJECTION, SOLUTION INTRAMUSCULAR at 00:17

## 2017-10-09 RX ADMIN — SODIUM CHLORIDE, POTASSIUM CHLORIDE, SODIUM LACTATE AND CALCIUM CHLORIDE 96 ML/HR: 600; 310; 30; 20 INJECTION, SOLUTION INTRAVENOUS at 23:25

## 2017-10-09 RX ADMIN — SODIUM CITRATE AND CITRIC ACID MONOHYDRATE 30 ML: 500; 334 SOLUTION ORAL at 01:25

## 2017-10-09 RX ADMIN — CLINDAMYCIN PHOSPHATE 900 MG: 18 INJECTION, SOLUTION INTRAVENOUS at 05:26

## 2017-10-09 RX ADMIN — GENTAMICIN SULFATE 100 MG: 100 INJECTION, SOLUTION INTRAVENOUS at 06:29

## 2017-10-09 RX ADMIN — OXYCODONE AND ACETAMINOPHEN 1 TABLET: 5; 325 TABLET ORAL at 12:16

## 2017-10-09 RX ADMIN — Medication 325 MG: at 09:14

## 2017-10-09 RX ADMIN — CLINDAMYCIN PHOSPHATE 900 MG: 18 INJECTION, SOLUTION INTRAVENOUS at 14:41

## 2017-10-09 RX ADMIN — Medication 325 MG: at 18:31

## 2017-10-09 RX ADMIN — CEFAZOLIN SODIUM 2 G: 2 INJECTION, SOLUTION INTRAVENOUS at 01:15

## 2017-10-09 RX ADMIN — LABETALOL HYDROCHLORIDE 100 MG: 200 TABLET, FILM COATED ORAL at 23:25

## 2017-10-09 RX ADMIN — WITCH HAZEL 1 PAD: 500 SOLUTION RECTAL; TOPICAL at 16:13

## 2017-10-09 RX ADMIN — DOCUSATE SODIUM 100 MG: 100 CAPSULE, LIQUID FILLED ORAL at 09:28

## 2017-10-09 RX ADMIN — BUPIVACAINE HYDROCHLORIDE IN DEXTROSE 1.5 MG: 7.5 INJECTION, SOLUTION SUBARACHNOID at 01:38

## 2017-10-09 RX ADMIN — Medication: at 16:13

## 2017-10-09 RX ADMIN — ACETAMINOPHEN 650 MG: 325 TABLET, FILM COATED ORAL at 04:31

## 2017-10-09 RX ADMIN — AMOXICILLIN 500 MG: 250 CAPSULE ORAL at 20:31

## 2017-10-09 RX ADMIN — SODIUM CHLORIDE, POTASSIUM CHLORIDE, SODIUM LACTATE AND CALCIUM CHLORIDE: 600; 310; 30; 20 INJECTION, SOLUTION INTRAVENOUS at 01:43

## 2017-10-09 RX ADMIN — GENTAMICIN SULFATE 80 MG: 80 INJECTION, SOLUTION INTRAVENOUS at 22:32

## 2017-10-09 RX ADMIN — MIDAZOLAM 2 MG: 1 INJECTION INTRAMUSCULAR; INTRAVENOUS at 01:35

## 2017-10-09 RX ADMIN — CLINDAMYCIN PHOSPHATE 900 MG: 18 INJECTION, SOLUTION INTRAVENOUS at 23:35

## 2017-10-09 RX ADMIN — OXYCODONE AND ACETAMINOPHEN 1 TABLET: 5; 325 TABLET ORAL at 09:28

## 2017-10-09 RX ADMIN — Medication 2 G/HR: at 04:47

## 2017-10-09 RX ADMIN — IBUPROFEN 600 MG: 600 TABLET, FILM COATED ORAL at 12:16

## 2017-10-09 RX ADMIN — PRENATAL VIT W/ FE FUMARATE-FA TAB 27-0.8 MG 1 TABLET: 27-0.8 TAB at 09:14

## 2017-10-09 RX ADMIN — IBUPROFEN 600 MG: 600 TABLET, FILM COATED ORAL at 18:31

## 2017-10-09 RX ADMIN — AMOXICILLIN 500 MG: 250 CAPSULE ORAL at 10:31

## 2017-10-09 RX ADMIN — SODIUM CHLORIDE, POTASSIUM CHLORIDE, SODIUM LACTATE AND CALCIUM CHLORIDE 96 ML/HR: 600; 310; 30; 20 INJECTION, SOLUTION INTRAVENOUS at 07:56

## 2017-10-09 NOTE — ANESTHESIA PROCEDURE NOTES
Spinal Block    Start Time: 10/9/2017 1:38 AM  Performed By  Anesthesiologist: AMANDA CHRISTIAN  Preanesthetic Checklist  Completed: patient identified, site marked, surgical consent, pre-op evaluation, timeout performed, IV checked, risks and benefits discussed and monitors and equipment checked  Spinal Block Prep:  Patient Position:sitting  Sterile Tech:cap, gloves and sterile barriers  Prep:alcohol swabs  Patient Monitoring:EKG, continuous pulse oximetry and blood pressure monitoring  Spinal Block Procedure  Approach:midline  Guidance:palpation technique  Location:L2-L3  Needle Type:P[place]  Needle Gauge:25 G  Placement of Spinal needle event:cerebrospinal fluid aspirated  Paresthesia: no  Fluid Appearance:clear  Post Assessment  Patient Tolerance:patient tolerated the procedure well with no apparent complications  Complications no

## 2017-10-09 NOTE — ANESTHESIA POSTPROCEDURE EVALUATION
Patient: Sai Hays    Procedure Summary     Date Anesthesia Start Anesthesia Stop Room / Location    10/09/17          Procedure Diagnosis Scheduled Providers Provider    LABOR ANALGESIA No diagnosis on file.  No responsible provider of record.          Anesthesia Type: No value filed.  Last vitals  BP   134/66 (10/09/17 0736)    Temp   98.8 °F (37.1 °C) (10/09/17 0650)    Pulse   96 (10/09/17 0740)   Resp        SpO2   99 % (10/09/17 0740)      Post Anesthesia Care and Evaluation    Patient location during evaluation: bedside  Patient participation: complete - patient participated  Level of consciousness: awake and alert  Pain management: adequate  Airway patency: patent  Anesthetic complications: No anesthetic complications    Cardiovascular status: acceptable  Respiratory status: acceptable  Hydration status: acceptable  Post Neuraxial Block status: Motor and sensory function returned to baseline and No signs or symptoms of PDPH

## 2017-10-09 NOTE — PROGRESS NOTES
[employer]  Vaginal Delivery Progress Note    Subjective     Doing well, pain controlled, lochia less than menses.  Denies HA, vision change and epigastric pain.        Objective     Vital Signs Range for the last 24 hours  Temperature: Temp:  [97.5 °F (36.4 °C)-100 °F (37.8 °C)] 98.4 °F (36.9 °C)   Temp Source: Temp src: Oral   BP: BP: (100-177)/() 134/66   Pulse: Heart Rate:  [] 97   Respirations: Resp:  [10-20] 16   SPO2: SpO2:  [98 %-100 %] 98 %   O2 Amount (l/min):     O2 Devices           Physical Exam:  General:  no acute distresss.  Abdomen: Soft, non-tender, fundus firm  Extremities: normal, atraumatic, no cyanosis, and trace edema.       Lab results reviewed:  Yes    Lab Results   Component Value Date    WBC 19.49 (H) 10/09/2017    HGB 7.9 (L) 10/09/2017    HCT 24.4 (L) 10/09/2017    MCV 81.3 10/09/2017     10/09/2017         Assessment/Plan     Active Problems:    Pregnancy  Vaginal Delivery  Postpartum hemorrhage  Status post blood transfusion.      Sai Hays is Day 1  post-partum         Plan:  Repeat CBC with diff  Continue magnesium for 24 hours after delivery.   Strict I&Os      Ellen Pires CNM  10/9/2017  9:01 AM

## 2017-10-09 NOTE — ANESTHESIA PREPROCEDURE EVALUATION
Anesthesia Evaluation     Patient summary reviewed and Nursing notes reviewed          Airway   Mallampati: I  TM distance: <3 FB  Neck ROM: full  no difficulty expected  Dental - normal exam     Pulmonary - negative pulmonary ROS and normal exam   Cardiovascular - negative cardio ROS and normal exam        Neuro/Psych- negative ROS  GI/Hepatic/Renal/Endo - negative ROS     Musculoskeletal (-) negative ROS    Abdominal  - normal exam    Bowel sounds: normal.   Substance History - negative use     OB/GYN negative ob/gyn ROS         Other                                        Anesthesia Plan    ASA 3 - emergent     spinal     Anesthetic plan and risks discussed with patient.  Use of blood products discussed with patient .   Plan discussed with attending.

## 2017-10-09 NOTE — PLAN OF CARE
Problem: Patient Care Overview (Adult)  Goal: Plan of Care Review  Outcome: Ongoing (interventions implemented as appropriate)    10/09/17 0426   Coping/Psychosocial Response Interventions   Plan Of Care Reviewed With patient;family   Patient Care Overview   Progress improving       Goal: Adult Individualization and Mutuality  Outcome: Ongoing (interventions implemented as appropriate)    10/09/17 0426   Individualization   Patient Specific Goals Increase H&H   Patient Specific Interventions Blood transfusion    Mutuality/Individual Preferences   What Anxieties, Fears or Concerns Do You Have About Your Health or Care? Issues with breastfeeding    What Questions Do You Have About Your Health or Care? None   What Information Would Help Us Give You More Personalized Care? N/A       Goal: Discharge Needs Assessment  Outcome: Ongoing (interventions implemented as appropriate)    10/09/17 0426   Discharge Needs Assessment   Concerns To Be Addressed no discharge needs identified   Discharge Disposition home or self-care   Current Health   Anticipated Changes Related to Illness none   Self-Care   Equipment Currently Used at Home none   Living Environment   Transportation Available car         Problem: Hypertensive Disorders in Pregnancy (Adult,Obstetrics,Pediatric)  Goal: Signs and Symptoms of Listed Potential Problems Will be Absent or Manageable (Hypertensive Disorders in Pregnancy)  Outcome: Ongoing (interventions implemented as appropriate)    10/09/17 0426   Hypertensive Disorders in Pregnancy   Problems Assessed (Hypertensive Disorders in Pregnancy) all   Problems Present (Hypertensive Disorders in Pregnancy) none         Problem: Postpartum, Vaginal Delivery (Adult)  Goal: Signs and Symptoms of Listed Potential Problems Will be Absent or Manageable (Postpartum, Vaginal Delivery)  Outcome: Ongoing (interventions implemented as appropriate)    10/09/17 0426   Postpartum, Vaginal Delivery   Problems Assessed (Postpartum  Vaginal Delivery) all   Problems Present (Postpartum Vaginal Delivery) hemorrhage

## 2017-10-09 NOTE — L&D DELIVERY NOTE
[facility]  Vaginal Delivery Note    Delivery     Delivery: Vaginal, Spontaneous Delivery     YOB: 2018    Time of Birth: 9:07 PM      Anesthesia: None     Delivering clinician: Madisyn Reed    Forceps?   No   Vacuum? No    Shoulder dystocia present: No        Delivery narrative:  Uncomplicated  over a 2nd laceration.  Anterior shoulder delivered with ease.  Loose nuchal x 1 reduced easily.  Infant vigorous at delivery so delayed cord clamping x 1 min.  Cord doubly clamped and cut.  Laceration repaired in standard fashion using 3-0 vicryl.  Uterine atony noted so 800mcg of cytotec placed rectally.    Infant    Findings: female  infant     Infant observations: Weight: 7 lb 6 oz (3.345 kg)   Length: 20  in  Observations/Comments:         Apgars: 8   @ 1 minute /    9   @ 5 minutes         Placenta, Cord, and Fluid    Placenta delivered  Spontaneous  at   10/8  9:14 PM     Cord: 3 vessels  present.   Nuchal Cord?  yes; Number of nuchal loops present:  1      Cord blood obtained: Yes    Cord gases obtained:  No    Cord gas results: Venous:  No results found for: PHCVEN    Arterial:  No results found for: PHCART     Repair    Episiotomy: None    Lacerations: Yes  Laceration Information  Laceration Repaired?   Perineal: 2nd  Yes    Periurethral:         Labial:         Sulcus:         Vaginal: Yes  Yes    Cervical: No             Estimated Blood Loss:  500mL     Suture used for repair: 3-0 Vicryl and 3-0 Vicryl     Complications  Preeclampsia w/ severe features    Disposition  Mother to Remain in LD  in stable condition currently.  Baby to remains with mom  in stable condition currently.      Madisyn Reed MD  10/08/17  9:43 PM

## 2017-10-09 NOTE — PROGRESS NOTES
Called  To the room to evaluate post partum bleeding  She delivered vaginally with a second degree and vaginal wall laceration requiring repair.  EBL for the delivery was 500 mL.  When getting up, she had additional bleeding including large blood clots that is estimated to be an   Additional 600 mL.  Her bladder as emptied via straight cath and she received Hemabate.   A bedside USN shows that she still has quite a   Bit of clot in her uterus and possibly RPOC.      Will draw CBC, Fibrinogen, and Coags  2 units of blood on hold  Will take to O.R. For exam under anesthesia and possible D&C  Spoke with Dr. Reed to update her - she is en route

## 2017-10-09 NOTE — PROGRESS NOTES
Component      Latest Ref Rng & Units 10/8/2017 10/9/2017   WBC      3.50 - 10.80 10*3/mm3 7.83 19.49 (H)   RBC      3.89 - 5.14 10*6/mm3 4.00 3.00 (L)   Hemoglobin      11.5 - 15.5 g/dL 10.4 (L) 7.9 (L)   Hematocrit      34.5 - 44.0 % 33.1 (L) 24.4 (L)   MCV      80.0 - 99.0 fL 82.8 81.3   MCH      27.0 - 31.0 pg 26.0 (L) 26.3 (L)   MCHC      32.0 - 36.0 g/dL 31.4 (L) 32.4   RDW      11.3 - 14.5 % 17.1 (H) 17.5 (H)   RDW-SD      37.0 - 54.0 fl 51.9 52.2     MPV      6.0 - 12.0 fL 12.2 (H) 11.5   Platelets      150 - 450 10*3/mm3 177 191   Protime      9.6 - 11.5 Seconds  9.9   INR        0.91   Fibrinogen      200 - 400 mg/dL  339   aPTT      24.0 - 31.0 seconds  27.6     Significant drop in H&H post delivery and passage of 600cc of clot.  Discussed proceeding with blood transfusion due to additional expected blood loss at time of D&C and current tachycardia.  Discussed risks, benefits, and alternatives.  Patient accepts risks and wishes to proceed with transfusion.  Will transfuse 2 units of PRBCs and repeat H&C 4h after complete.    Madisyn Reed MD  1:11 AM

## 2017-10-09 NOTE — OP NOTE
Dilation and Curettage Procedure Note        Pre-operative Diagnosis: postpartum hemorrhage     Post-operative Diagnosis: same, retained membranes     Operation: manual evacuation of blood clot, sharp curettage, placement of Bakri balloon     Surgeon: Madisyn Reed MD      Assistants: Dr. Bal     Anesthesia: spinal anesthesia     Findings: distended uterus filled with 600mL of clot which was evacuated.  Boggy lower uterine segment following evacuation     Estimated Blood Loss: 800mL       Specimens: none     Complications:  None     Disposition: PACU - hemodynamically stable.     Procedure Details    The patient was seen in the LDR. The risks, benefits, complications, treatment options, and expected outcomes were discussed with the patient. The patient concurred with the proposed plan, giving informed consent. The patient was identified as Sai Hays  and the procedure verified as D&C. A Time Out was held and the above information confirmed.     After confirmation of anesthesia, the patient was draped and prepped in the usual sterile manner. An exam under anesthesia was performed. 600mL of clot was removed from the fundus of the uterus.  Bimanual massage was performed.  The lower uterine segment remained boggy.  A banjo curette was then used to perform a sharp D&C under ultrasound guidance.  A small amount of placental tissue and membranes were removed.  Bimanual massage was again performed.  Due to the boggy lower uterine segment, a bakri balloon was placed.  The balloon was filled with 180mL of NS.  A sterile way catheter was inserted.  Hemostasis was noted.  The patient was taken out of the candy cane stirrups and transferred to PACU in good condition.  The counts were correct x 2.     Madisyn Reed MD  2:12 AM

## 2017-10-09 NOTE — ANESTHESIA POSTPROCEDURE EVALUATION
Patient: Sai Hays    Procedure Summary     Date Anesthesia Start Anesthesia Stop Room / Location    10/09/17 0133 0216  [facility] /  [facility]       Procedure Diagnosis Surgeon Provider    DILATATION AND CURETTAGE (N/A Vagina) No diagnosis on file. MD Hiram Bustillo MD          Anesthesia Type: spinal  Last vitals  /59      Temp     98.5   Pulse    93   Resp 16       SpO2     100 on 2 liters o2     Post Anesthesia Care and Evaluation    Patient location during evaluation: bedside  Patient participation: complete - patient participated  Level of consciousness: lethargic and awake  Pain score: 0  Pain management: adequate  Airway patency: patent  Anesthetic complications: No anesthetic complications    Cardiovascular status: acceptable  Respiratory status: acceptable  Hydration status: acceptable

## 2017-10-09 NOTE — NURSING NOTE
"2007- Dr. Reed called- pt. Has anterior lip and extreme urge to push. Dr. Reed said to recheck the patient in 15 minutes.   2016- Patient pushing \"involuntarily\"   2017- RN rechecked patients cervix, patient still had anterior lip but still had extreme urge to push. Dr. Reed was called and notified- Dr. Reed says she is on her way to the hospital   2030- Dr. Reed at bedside  2033- Patient complete and pushing   2125- Cytotec given by Dr. Reed     2246- patient ambulated to restroom with help of RN and her mother. Patient was unable to void. Patient cleaned up, pads and panties applied.     2340- RN performed a fundal check, patient still had moderate amount of bleeding. Patient stated she would try to void.   2345- pt. Ambulated to bathroom, patient stated she felt lightheaded, RN helped guide her to sit down onto the toilet. Patient and RN heard a gush. RN looked and saw multiple blood clots and blood. RN called out for help to get patient back to the bed. RN called for Dr. Redmond.   2355- Dr. Redmond at bedside, Dr. Redmond used a hand held ultrasound and saw multiple clots in her uterus. Dr. Redmond ordered labs, and hemabate.   0000- Fundal massage applied   0030- Dr. Redmond and Dr. Reed decided to go back to the OR to do a D & C. Dr. Reed also ordered 2 units of PRBC due to decrease in H &H   0042- 500 mL of Lactate ringers bolus given   0100- Derek at bedside to talk about procedure with patient and family.   0130- in OR  0146- Dr. Reed performed straight in/out catheterization; unknown amount of output   0156- Bakri inserted; inflated with 180 CCs with Normal Saline     0430- After 15 minutes of second unit of PRBC- temperature rechecked and it was 100.0. Dr. Redmond notified. Tylenol given. He is on his way to assess the situation.   0435- Blood restarted per Dr. Redmond order  "

## 2017-10-10 LAB
ABO + RH BLD: NORMAL
ABO + RH BLD: NORMAL
BH BB BLOOD EXPIRATION DATE: NORMAL
BH BB BLOOD EXPIRATION DATE: NORMAL
BH BB BLOOD TYPE BARCODE: 6200
BH BB BLOOD TYPE BARCODE: 6200
BH BB DISPENSE STATUS: NORMAL
BH BB DISPENSE STATUS: NORMAL
BH BB PRODUCT CODE: NORMAL
BH BB PRODUCT CODE: NORMAL
BH BB UNIT NUMBER: NORMAL
BH BB UNIT NUMBER: NORMAL
UNIT  ABO: NORMAL
UNIT  ABO: NORMAL
UNIT  RH: NORMAL
UNIT  RH: NORMAL

## 2017-10-10 PROCEDURE — 25010000002 GENTAMICIN PER 80 MG

## 2017-10-10 RX ADMIN — PRENATAL VIT W/ FE FUMARATE-FA TAB 27-0.8 MG 1 TABLET: 27-0.8 TAB at 09:03

## 2017-10-10 RX ADMIN — AMOXICILLIN 500 MG: 250 CAPSULE ORAL at 17:12

## 2017-10-10 RX ADMIN — GENTAMICIN SULFATE 80 MG: 80 INJECTION, SOLUTION INTRAVENOUS at 06:53

## 2017-10-10 RX ADMIN — DOCUSATE SODIUM 100 MG: 100 CAPSULE, LIQUID FILLED ORAL at 09:04

## 2017-10-10 RX ADMIN — AMOXICILLIN 500 MG: 250 CAPSULE ORAL at 21:50

## 2017-10-10 RX ADMIN — DOCUSATE SODIUM 100 MG: 100 CAPSULE, LIQUID FILLED ORAL at 17:12

## 2017-10-10 RX ADMIN — HYDROCODONE BITARTRATE AND ACETAMINOPHEN 1 TABLET: 5; 325 TABLET ORAL at 06:20

## 2017-10-10 RX ADMIN — Medication 325 MG: at 09:03

## 2017-10-10 RX ADMIN — LABETALOL HYDROCHLORIDE 100 MG: 200 TABLET, FILM COATED ORAL at 09:03

## 2017-10-10 RX ADMIN — AMOXICILLIN 500 MG: 250 CAPSULE ORAL at 09:08

## 2017-10-10 RX ADMIN — IBUPROFEN 600 MG: 600 TABLET, FILM COATED ORAL at 06:20

## 2017-10-10 RX ADMIN — LABETALOL HYDROCHLORIDE 100 MG: 200 TABLET, FILM COATED ORAL at 17:12

## 2017-10-10 RX ADMIN — LABETALOL HYDROCHLORIDE 100 MG: 200 TABLET, FILM COATED ORAL at 21:50

## 2017-10-10 RX ADMIN — CLINDAMYCIN PHOSPHATE 900 MG: 18 INJECTION, SOLUTION INTRAVENOUS at 06:52

## 2017-10-10 RX ADMIN — Medication 325 MG: at 17:12

## 2017-10-10 NOTE — PROGRESS NOTES
10/10/2017  PPD #2    Subjective   Sai feels well.  Patient describes her lochia as less than menses.  Pain is well controlled       Objective   Temp: Temp:  [97.5 °F (36.4 °C)-99.2 °F (37.3 °C)] 97.9 °F (36.6 °C) Temp src: Oral   BP: BP: (111-155)/(66-97) 122/72        Pulse: Heart Rate:  [] 83  RR: Resp:  [14-20] 14    General:  No acute distress   Abdomen: Fundus firm and beneath umbilicus   Pelvis: deferred     Lab Results   Component Value Date    WBC 13.60 (H) 10/09/2017    HGB 8.2 (L) 10/09/2017    HCT 24.7 (L) 10/09/2017    MCV 82.1 10/09/2017     10/09/2017    HEPBSAG Negative 04/14/2017       Assessment  1. PPD# 2 after vaginal delivery   2. Post-partum Hemorrhage  3. Post-op D/C  4. Pre-eclampsia    Plan  1. Discontinue IV  2. Discontinue IV antibiodics  3. Ok to shower  4. Monitor blood pressure today with orals, if they remain stable possibly discharge tonight but most likely discharge in am.       This note has been electronically signed.    Kaylie Pinzon,   October 10, 2017

## 2017-10-10 NOTE — LACTATION NOTE
"This note was copied from a baby's chart.     10/10/17 0925   Maternal Information   Date of Referral 10/10/17   Person Making Referral other (see comments)  (courtesy)   Maternal Reason for Referral breastfeeding currently   Maternal Infant Assessment   Size Issue, Bilateral Breasts no   Shape, Bilateral Breasts round   Density, Bilateral Breasts soft   Nipples, Bilateral graspable   Nipple Condition, Left intact   Nipple Conditions, Right abraded  (abraded on tip of nipple)   Nipple Conditions, Bilateral tender   Infant Assessment   Sucking Reflex present   Rooting Reflex present   Swallow Reflex present   LATCH Score   Latch 2-->grasps breast, tongue down, lips flanged, rhythmic sucking   Audible Swallowing 2-->spontaneous and intermittent (24 hrs old)   Type Of Nipple 2-->everted (after stimulation)   Comfort (Breast/Nipple) 1-->filling, red/small blisters/bruises, mild/mod discomfort   Hold (Positioning) 1-->minimal assist, teach one side: mother does other, staff holds   Score (less than 7 for 2/more consecutive times, consult Lactation Consultant) 8   Maternal Infant Feeding   Maternal Emotional State anxious   Previous Breastfeeding History no   Infant Positioning cross-cradle;clutch/\"football\"   Signs of Milk Transfer audible swallow   Comfort Measures Before/During Feeding infant position adjusted;latch adjusted;maternal position adjusted   Nipple Shape After Feeding, Left Breast round;symmetrical;appropriately projected   Nipple Shape After Feeding, Right Breast round;symmetrical;appropriately projected   Latch Assistance yes   Current Delivery Breastfeeding History   Currently Breastfeeding yes   Feeding Infant   Feeding Readiness Cues rooting   Effective Latch During Feeding yes   Audible Swallow yes   Suck/Swallow Coordination present   Skin-to-Skin Contact During Feeding yes   Equipment Type/Education   Breast Pump Type other (see comments)  (Rx given)   Additional Equipment hydrogel pads  (encouraged " d/c paci use, mom has shield but doesn't use)

## 2017-10-10 NOTE — PLAN OF CARE
Problem: Patient Care Overview (Adult)  Goal: Plan of Care Review  Outcome: Ongoing (interventions implemented as appropriate)    10/10/17 1010   Coping/Psychosocial Response Interventions   Plan Of Care Reviewed With patient   Patient Care Overview   Progress improving       Goal: Adult Individualization and Mutuality  Outcome: Ongoing (interventions implemented as appropriate)  Goal: Discharge Needs Assessment  Outcome: Ongoing (interventions implemented as appropriate)    Problem: Hypertensive Disorders in Pregnancy (Adult,Obstetrics,Pediatric)  Goal: Signs and Symptoms of Listed Potential Problems Will be Absent or Manageable (Hypertensive Disorders in Pregnancy)  Outcome: Ongoing (interventions implemented as appropriate)    Problem: Postpartum, Vaginal Delivery (Adult)  Goal: Signs and Symptoms of Listed Potential Problems Will be Absent or Manageable (Postpartum, Vaginal Delivery)  Outcome: Ongoing (interventions implemented as appropriate)    Problem: Breastfeeding (Adult,NICU,,Obstetrics,Pediatric)  Goal: Signs and Symptoms of Listed Potential Problems Will be Absent or Manageable (Breastfeeding)  Outcome: Ongoing (interventions implemented as appropriate)

## 2017-10-10 NOTE — PLAN OF CARE
Problem: Patient Care Overview (Adult)  Goal: Plan of Care Review  Outcome: Ongoing (interventions implemented as appropriate)    Problem: Breastfeeding (Adult,NICU,[place],Obstetrics,Pediatric)  Goal: Signs and Symptoms of Listed Potential Problems Will be Absent or Manageable (Breastfeeding)  Outcome: Ongoing (interventions implemented as appropriate)

## 2017-10-11 ENCOUNTER — RESULTS ENCOUNTER (OUTPATIENT)
Dept: LAB | Facility: HOSPITAL | Age: 22
End: 2017-10-11

## 2017-10-11 DIAGNOSIS — Z34.83 PRENATAL CARE, SUBSEQUENT PREGNANCY, THIRD TRIMESTER: ICD-10-CM

## 2017-10-11 DIAGNOSIS — Z3A.38 38 WEEKS GESTATION OF PREGNANCY: ICD-10-CM

## 2017-10-11 RX ORDER — LABETALOL 200 MG/1
200 TABLET, FILM COATED ORAL EVERY 8 HOURS SCHEDULED
Status: DISCONTINUED | OUTPATIENT
Start: 2017-10-11 | End: 2017-10-12 | Stop reason: HOSPADM

## 2017-10-11 RX ORDER — NIFEDIPINE 30 MG/1
30 TABLET, EXTENDED RELEASE ORAL EVERY 12 HOURS SCHEDULED
Status: DISCONTINUED | OUTPATIENT
Start: 2017-10-11 | End: 2017-10-12 | Stop reason: HOSPADM

## 2017-10-11 RX ADMIN — AMOXICILLIN 500 MG: 250 CAPSULE ORAL at 21:35

## 2017-10-11 RX ADMIN — LABETALOL HYDROCHLORIDE 200 MG: 200 TABLET, FILM COATED ORAL at 17:00

## 2017-10-11 RX ADMIN — Medication 325 MG: at 17:00

## 2017-10-11 RX ADMIN — DOCUSATE SODIUM 100 MG: 100 CAPSULE, LIQUID FILLED ORAL at 09:12

## 2017-10-11 RX ADMIN — PRENATAL VIT W/ FE FUMARATE-FA TAB 27-0.8 MG 1 TABLET: 27-0.8 TAB at 09:12

## 2017-10-11 RX ADMIN — Medication 325 MG: at 09:12

## 2017-10-11 RX ADMIN — ACETAMINOPHEN 650 MG: 325 TABLET, FILM COATED ORAL at 17:02

## 2017-10-11 RX ADMIN — AMOXICILLIN 500 MG: 250 CAPSULE ORAL at 16:59

## 2017-10-11 RX ADMIN — DOCUSATE SODIUM 100 MG: 100 CAPSULE, LIQUID FILLED ORAL at 17:00

## 2017-10-11 RX ADMIN — NIFEDIPINE 30 MG: 30 TABLET, FILM COATED, EXTENDED RELEASE ORAL at 21:35

## 2017-10-11 RX ADMIN — NIFEDIPINE 30 MG: 30 TABLET, FILM COATED, EXTENDED RELEASE ORAL at 09:12

## 2017-10-11 RX ADMIN — LABETALOL HYDROCHLORIDE 100 MG: 200 TABLET, FILM COATED ORAL at 09:12

## 2017-10-11 RX ADMIN — LABETALOL HYDROCHLORIDE 200 MG: 200 TABLET, FILM COATED ORAL at 21:34

## 2017-10-11 RX ADMIN — AMOXICILLIN 500 MG: 250 CAPSULE ORAL at 09:11

## 2017-10-11 NOTE — PLAN OF CARE
Problem: Patient Care Overview (Adult)  Goal: Plan of Care Review  Outcome: Ongoing (interventions implemented as appropriate)    10/11/17 3390   Coping/Psychosocial Response Interventions   Plan Of Care Reviewed With patient   Patient Care Overview   Progress improving       Goal: Adult Individualization and Mutuality  Outcome: Ongoing (interventions implemented as appropriate)  Goal: Discharge Needs Assessment  Outcome: Ongoing (interventions implemented as appropriate)    Problem: Hypertensive Disorders in Pregnancy (Adult,Obstetrics,Pediatric)  Goal: Signs and Symptoms of Listed Potential Problems Will be Absent or Manageable (Hypertensive Disorders in Pregnancy)  Outcome: Ongoing (interventions implemented as appropriate)    Problem: Postpartum, Vaginal Delivery (Adult)  Goal: Signs and Symptoms of Listed Potential Problems Will be Absent or Manageable (Postpartum, Vaginal Delivery)  Outcome: Ongoing (interventions implemented as appropriate)    Problem: Breastfeeding (Adult,NICU,,Obstetrics,Pediatric)  Goal: Signs and Symptoms of Listed Potential Problems Will be Absent or Manageable (Breastfeeding)  Outcome: Ongoing (interventions implemented as appropriate)

## 2017-10-11 NOTE — PROGRESS NOTES
10/11/2017  PPD #3    Subjective   Sai feels well.  Patient describes her lochia as less than menses.  Pain is well controlled       Objective   Temp: Temp:  [98.5 °F (36.9 °C)-99.2 °F (37.3 °C)] 98.5 °F (36.9 °C) Temp src: Oral   BP: BP: (131-159)/(63-91) 135/91        Pulse: Heart Rate:  [80-88] 80  RR: Resp:  [16-18] 18    General:  No acute distress   Abdomen: Fundus firm and beneath umbilicus   Pelvis: deferred     Lab Results   Component Value Date    WBC 13.60 (H) 10/09/2017    HGB 8.2 (L) 10/09/2017    HCT 24.7 (L) 10/09/2017    MCV 82.1 10/09/2017     10/09/2017    HEPBSAG Negative 04/14/2017       Assessment  1. PPD# 3 after vaginal delivery   2. Post-partum Hemorrhage  3. Post-op D&C  4. Pre-eclampsia w/ severe features    Plan  1. Add nifedipine XL 30mg po BID for elevated BPs.  Cont labetalol 100mg po TID  2. If BPs well controlled throughout the day, will d/c late afternoon  3. BP check in office on Fri.  Has cuff at home and will check TID      This note has been electronically signed.    Madisyn Reed MD  October 11, 2017       Addendum:  BPs still elevated.  Increase labetalol to 200mg po TID.  Keep inpatient     Madisyn Reed MD  3:20 PM

## 2017-10-12 ENCOUNTER — HOSPITAL ENCOUNTER (OUTPATIENT)
Dept: LABOR AND DELIVERY | Facility: HOSPITAL | Age: 22
Discharge: HOME | End: 2017-10-12

## 2017-10-12 VITALS
DIASTOLIC BLOOD PRESSURE: 73 MMHG | WEIGHT: 158 LBS | HEIGHT: 63 IN | RESPIRATION RATE: 18 BRPM | HEART RATE: 86 BPM | BODY MASS INDEX: 28 KG/M2 | OXYGEN SATURATION: 99 % | TEMPERATURE: 98.5 F | SYSTOLIC BLOOD PRESSURE: 130 MMHG

## 2017-10-12 PROBLEM — Z34.90 PREGNANCY: Status: RESOLVED | Noted: 2017-10-08 | Resolved: 2017-10-12

## 2017-10-12 RX ORDER — PYRAZINAMIDE 500 MG/1
1 TABLET ORAL EVERY 4 HOURS PRN
Qty: 15 TABLET | Refills: 0 | Status: SHIPPED | OUTPATIENT
Start: 2017-10-12 | End: 2018-10-12

## 2017-10-12 RX ORDER — LABETALOL 200 MG/1
200 TABLET, FILM COATED ORAL 3 TIMES DAILY
Qty: 120 TABLET | Refills: 1 | Status: SHIPPED | OUTPATIENT
Start: 2017-10-12 | End: 2020-05-03 | Stop reason: HOSPADM

## 2017-10-12 RX ORDER — IBUPROFEN 600 MG/1
600 TABLET ORAL EVERY 6 HOURS PRN
Qty: 30 TABLET | Refills: 1 | Status: ON HOLD | OUTPATIENT
Start: 2017-10-12 | End: 2020-05-03 | Stop reason: SDUPTHER

## 2017-10-12 RX ORDER — NIFEDIPINE 30 MG/1
30 TABLET, FILM COATED, EXTENDED RELEASE ORAL 2 TIMES DAILY
Qty: 60 TABLET | Refills: 3 | Status: SHIPPED | OUTPATIENT
Start: 2017-10-12 | End: 2020-05-03 | Stop reason: HOSPADM

## 2017-10-12 RX ORDER — AMOXICILLIN 500 MG/1
500 CAPSULE ORAL 2 TIMES DAILY
Qty: 14 CAPSULE | Refills: 0 | Status: SHIPPED | OUTPATIENT
Start: 2017-10-12 | End: 2020-05-03 | Stop reason: HOSPADM

## 2017-10-12 RX ADMIN — PRENATAL VIT W/ FE FUMARATE-FA TAB 27-0.8 MG 1 TABLET: 27-0.8 TAB at 08:15

## 2017-10-12 RX ADMIN — NIFEDIPINE 30 MG: 30 TABLET, FILM COATED, EXTENDED RELEASE ORAL at 08:14

## 2017-10-12 RX ADMIN — LABETALOL HYDROCHLORIDE 200 MG: 200 TABLET, FILM COATED ORAL at 05:32

## 2017-10-12 RX ADMIN — AMOXICILLIN 500 MG: 250 CAPSULE ORAL at 08:14

## 2017-10-12 RX ADMIN — Medication 325 MG: at 08:15

## 2017-10-12 NOTE — PLAN OF CARE
Problem: Patient Care Overview (Adult)  Goal: Plan of Care Review  Outcome: Outcome(s) achieved Date Met:  10/12/17    10/12/17 0802   Outcome Evaluation   Outcome Summary/Follow up Plan VSS. FUNDUS FIRM, LIGHT LOCHIA. PAIN CONTROLLED.       Goal: Adult Individualization and Mutuality  Outcome: Outcome(s) achieved Date Met:  10/12/17  Goal: Discharge Needs Assessment  Outcome: Outcome(s) achieved Date Met:  10/12/17    Problem: Hypertensive Disorders in Pregnancy (Adult,Obstetrics,Pediatric)  Goal: Signs and Symptoms of Listed Potential Problems Will be Absent or Manageable (Hypertensive Disorders in Pregnancy)  Outcome: Outcome(s) achieved Date Met:  10/12/17    Problem: Postpartum, Vaginal Delivery (Adult)  Goal: Signs and Symptoms of Listed Potential Problems Will be Absent or Manageable (Postpartum, Vaginal Delivery)  Outcome: Outcome(s) achieved Date Met:  10/12/17    Problem: Breastfeeding (Adult,NICU,,Obstetrics,Pediatric)  Goal: Signs and Symptoms of Listed Potential Problems Will be Absent or Manageable (Breastfeeding)  Outcome: Outcome(s) achieved Date Met:  10/12/17

## 2017-10-12 NOTE — DISCHARGE SUMMARY
[facility]   Discharge Summary      Patient: Sai Hays      MR#:[id number]  Admission  Diagnosis: Pregnancy  Discharge Diagnosis: Same    Date of Admission: 10/8/2017  Date of Discharge:  10/12/2017    Procedures:  Vaginal, Spontaneous Delivery     10/8/2017    9:07 PM      Service:  Obstetrics    Hospital Course:  Patient underwent vaginal delivery and remained in the hospital for 4 days.  During that time she remained afebrile and hemodynamically stable.  On the day of discharge, she was eating, ambulating and voiding without difficulty.  Her hospital stay was complicated by Pre-eclampsia and Post-partum hemorrhage that required blood transfusion and an emergent D & C.    Lab Results   Component Value Date    WBC 13.60 (H) 10/09/2017    HGB 8.2 (L) 10/09/2017    HCT 24.7 (L) 10/09/2017    MCV 82.1 10/09/2017     10/09/2017    URICACID 5.5 10/08/2017    AST 19 10/08/2017    ALT 12 10/08/2017     10/08/2017       Results from last 7 days  Lab Units 10/08/17  0357   ABO TYPING  A   RH TYPING  Positive   ANTIBODY SCREEN  Negative       Discharge Medications   Sai Hays   Home Medication Instructions STAS:[id number]    Printed on:10/12/17 0929   Medication Information                      amoxicillin (AMOXIL) 500 MG capsule  Take 500 mg by mouth 3 (Three) Times a Day.             ferrous sulfate 325 (65 FE) MG tablet  Take 325 mg by mouth Daily With Breakfast.             Prenatal Vit-Fe Fumarate-FA (PRENATAL 27-1) 27-1 MG tablet tablet  Take 1 tablet by mouth Daily.             Labatelol 200mg TID  Procardia 30mg XL BID  Amoxacillion 500 mg BID    Discharge Disposition:  To Home    Discharge Condition:  Stable    Discharge Diet: regular    Activity at Discharge: Pelvic rest    Follow-up Appointments  No future appointments.      Kaylie Pinzon DO  10/12/17  9:29 AM

## 2019-09-16 ENCOUNTER — LAB REQUISITION (OUTPATIENT)
Dept: LAB | Facility: HOSPITAL | Age: 24
End: 2019-09-16

## 2019-09-16 DIAGNOSIS — Z34.81 ENCOUNTER FOR SUPERVISION OF OTHER NORMAL PREGNANCY, FIRST TRIMESTER: ICD-10-CM

## 2019-12-27 ENCOUNTER — LAB REQUISITION (OUTPATIENT)
Dept: LAB | Facility: HOSPITAL | Age: 24
End: 2019-12-27

## 2019-12-27 DIAGNOSIS — Z00.00 ROUTINE GENERAL MEDICAL EXAMINATION AT A HEALTH CARE FACILITY: ICD-10-CM

## 2019-12-27 PROCEDURE — 36415 COLL VENOUS BLD VENIPUNCTURE: CPT | Performed by: ADVANCED PRACTICE MIDWIFE

## 2020-01-27 ENCOUNTER — LAB REQUISITION (OUTPATIENT)
Dept: LAB | Facility: HOSPITAL | Age: 25
End: 2020-01-27

## 2020-01-27 DIAGNOSIS — Z00.00 ROUTINE GENERAL MEDICAL EXAMINATION AT A HEALTH CARE FACILITY: ICD-10-CM

## 2020-01-27 PROCEDURE — 36415 COLL VENOUS BLD VENIPUNCTURE: CPT | Performed by: ADVANCED PRACTICE MIDWIFE

## 2020-04-17 ENCOUNTER — LAB REQUISITION (OUTPATIENT)
Dept: LAB | Facility: HOSPITAL | Age: 25
End: 2020-04-17

## 2020-04-17 DIAGNOSIS — Z00.00 ROUTINE GENERAL MEDICAL EXAMINATION AT A HEALTH CARE FACILITY: ICD-10-CM

## 2020-04-17 PROCEDURE — 36415 COLL VENOUS BLD VENIPUNCTURE: CPT | Performed by: NURSE PRACTITIONER

## 2020-05-01 ENCOUNTER — ANESTHESIA EVENT (OUTPATIENT)
Dept: LABOR AND DELIVERY | Facility: HOSPITAL | Age: 25
End: 2020-05-01

## 2020-05-01 ENCOUNTER — HOSPITAL ENCOUNTER (INPATIENT)
Facility: HOSPITAL | Age: 25
LOS: 2 days | Discharge: HOME | End: 2020-05-03
Attending: ADVANCED PRACTICE MIDWIFE | Admitting: OBSTETRICS & GYNECOLOGY

## 2020-05-01 ENCOUNTER — ANESTHESIA (OUTPATIENT)
Dept: LABOR AND DELIVERY | Facility: HOSPITAL | Age: 25
End: 2020-05-01

## 2020-05-01 PROBLEM — Z37.9 NORMAL LABOR: Status: RESOLVED | Noted: 2020-05-01 | Resolved: 2020-05-01

## 2020-05-01 PROBLEM — Z37.9 NORMAL LABOR: Status: ACTIVE | Noted: 2020-05-01

## 2020-05-01 LAB
ABO GROUP BLD: NORMAL
ALP SERPL-CCNC: 143 U/L (ref 39–117)
ALT SERPL W P-5'-P-CCNC: 13 U/L (ref 1–33)
AST SERPL-CCNC: 33 U/L (ref 1–32)
BILIRUB SERPL-MCNC: 0.2 MG/DL (ref 0.2–1.2)
BLD GP AB SCN SERPL QL: NEGATIVE
CREAT BLD-MCNC: 0.78 MG/DL (ref 0.57–1)
DEPRECATED RDW RBC AUTO: 45.7 FL (ref 37–54)
ERYTHROCYTE [DISTWIDTH] IN BLOOD BY AUTOMATED COUNT: 15.3 % (ref 12.3–15.4)
HCT VFR BLD AUTO: 38.9 % (ref 34–46.6)
HGB BLD-MCNC: 12.1 G/DL (ref 12–15.9)
LDH SERPL-CCNC: 402 U/L (ref 135–214)
MCH RBC QN AUTO: 26.2 PG (ref 26.6–33)
MCHC RBC AUTO-ENTMCNC: 31.1 G/DL (ref 31.5–35.7)
MCV RBC AUTO: 84.4 FL (ref 79–97)
PLATELET # BLD AUTO: 163 10*3/MM3 (ref 140–450)
PMV BLD AUTO: 13 FL (ref 6–12)
RBC # BLD AUTO: 4.61 10*6/MM3 (ref 3.77–5.28)
RH BLD: POSITIVE
T&S EXPIRATION DATE: NORMAL
URATE SERPL-MCNC: 7.4 MG/DL (ref 2.4–5.7)
WBC NRBC COR # BLD: 10.43 10*3/MM3 (ref 3.4–10.8)

## 2020-05-01 PROCEDURE — 86900 BLOOD TYPING SEROLOGIC ABO: CPT | Performed by: OBSTETRICS & GYNECOLOGY

## 2020-05-01 PROCEDURE — 25010000002 FENTANYL CITRATE (PF) 100 MCG/2ML SOLUTION: Performed by: NURSE ANESTHETIST, CERTIFIED REGISTERED

## 2020-05-01 PROCEDURE — 84075 ASSAY ALKALINE PHOSPHATASE: CPT | Performed by: OBSTETRICS & GYNECOLOGY

## 2020-05-01 PROCEDURE — 10907ZC DRAINAGE OF AMNIOTIC FLUID, THERAPEUTIC FROM PRODUCTS OF CONCEPTION, VIA NATURAL OR ARTIFICIAL OPENING: ICD-10-PCS | Performed by: ADVANCED PRACTICE MIDWIFE

## 2020-05-01 PROCEDURE — 83615 LACTATE (LD) (LDH) ENZYME: CPT | Performed by: OBSTETRICS & GYNECOLOGY

## 2020-05-01 PROCEDURE — 82565 ASSAY OF CREATININE: CPT | Performed by: OBSTETRICS & GYNECOLOGY

## 2020-05-01 PROCEDURE — C1755 CATHETER, INTRASPINAL: HCPCS | Performed by: ANESTHESIOLOGY

## 2020-05-01 PROCEDURE — 86850 RBC ANTIBODY SCREEN: CPT | Performed by: OBSTETRICS & GYNECOLOGY

## 2020-05-01 PROCEDURE — 25010000002 ROPIVACAINE PER 1 MG: Performed by: NURSE ANESTHETIST, CERTIFIED REGISTERED

## 2020-05-01 PROCEDURE — 84450 TRANSFERASE (AST) (SGOT): CPT | Performed by: OBSTETRICS & GYNECOLOGY

## 2020-05-01 PROCEDURE — 84550 ASSAY OF BLOOD/URIC ACID: CPT | Performed by: OBSTETRICS & GYNECOLOGY

## 2020-05-01 PROCEDURE — 86901 BLOOD TYPING SEROLOGIC RH(D): CPT | Performed by: OBSTETRICS & GYNECOLOGY

## 2020-05-01 PROCEDURE — 25010000003 MAGNESIUM SULFATE 20 GM/500ML SOLUTION: Performed by: ADVANCED PRACTICE MIDWIFE

## 2020-05-01 PROCEDURE — 84460 ALANINE AMINO (ALT) (SGPT): CPT | Performed by: OBSTETRICS & GYNECOLOGY

## 2020-05-01 PROCEDURE — 51702 INSERT TEMP BLADDER CATH: CPT

## 2020-05-01 PROCEDURE — C1755 CATHETER, INTRASPINAL: HCPCS

## 2020-05-01 PROCEDURE — 82247 BILIRUBIN TOTAL: CPT | Performed by: OBSTETRICS & GYNECOLOGY

## 2020-05-01 PROCEDURE — 85027 COMPLETE CBC AUTOMATED: CPT | Performed by: OBSTETRICS & GYNECOLOGY

## 2020-05-01 RX ORDER — PRENATAL VIT/IRON FUM/FOLIC AC 27MG-0.8MG
1 TABLET ORAL DAILY
Status: DISCONTINUED | OUTPATIENT
Start: 2020-05-02 | End: 2020-05-02

## 2020-05-01 RX ORDER — PROMETHAZINE HYDROCHLORIDE 12.5 MG/1
12.5 TABLET ORAL EVERY 6 HOURS PRN
Status: DISCONTINUED | OUTPATIENT
Start: 2020-05-01 | End: 2020-05-01 | Stop reason: HOSPADM

## 2020-05-01 RX ORDER — SODIUM CHLORIDE, SODIUM LACTATE, POTASSIUM CHLORIDE, CALCIUM CHLORIDE 600; 310; 30; 20 MG/100ML; MG/100ML; MG/100ML; MG/100ML
125 INJECTION, SOLUTION INTRAVENOUS CONTINUOUS
Status: DISCONTINUED | OUTPATIENT
Start: 2020-05-01 | End: 2020-05-03 | Stop reason: HOSPADM

## 2020-05-01 RX ORDER — CARBOPROST TROMETHAMINE 250 UG/ML
250 INJECTION, SOLUTION INTRAMUSCULAR AS NEEDED
Status: DISCONTINUED | OUTPATIENT
Start: 2020-05-01 | End: 2020-05-01 | Stop reason: HOSPADM

## 2020-05-01 RX ORDER — EPHEDRINE SULFATE/0.9% NACL/PF 25 MG/5 ML
10 SYRINGE (ML) INTRAVENOUS
Status: DISCONTINUED | OUTPATIENT
Start: 2020-05-01 | End: 2020-05-01 | Stop reason: HOSPADM

## 2020-05-01 RX ORDER — FENTANYL CITRATE 50 UG/ML
INJECTION, SOLUTION INTRAMUSCULAR; INTRAVENOUS AS NEEDED
Status: DISCONTINUED | OUTPATIENT
Start: 2020-05-01 | End: 2020-05-01 | Stop reason: SURG

## 2020-05-01 RX ORDER — ONDANSETRON 2 MG/ML
4 INJECTION INTRAMUSCULAR; INTRAVENOUS EVERY 6 HOURS PRN
Status: DISCONTINUED | OUTPATIENT
Start: 2020-05-01 | End: 2020-05-01

## 2020-05-01 RX ORDER — ONDANSETRON 2 MG/ML
4 INJECTION INTRAMUSCULAR; INTRAVENOUS ONCE AS NEEDED
Status: DISCONTINUED | OUTPATIENT
Start: 2020-05-01 | End: 2020-05-01 | Stop reason: HOSPADM

## 2020-05-01 RX ORDER — LIDOCAINE HYDROCHLORIDE 10 MG/ML
5 INJECTION, SOLUTION EPIDURAL; INFILTRATION; INTRACAUDAL; PERINEURAL AS NEEDED
Status: DISCONTINUED | OUTPATIENT
Start: 2020-05-01 | End: 2020-05-01 | Stop reason: HOSPADM

## 2020-05-01 RX ORDER — MAGNESIUM CARB/ALUMINUM HYDROX 105-160MG
30 TABLET,CHEWABLE ORAL ONCE
Status: DISCONTINUED | OUTPATIENT
Start: 2020-05-01 | End: 2020-05-01 | Stop reason: HOSPADM

## 2020-05-01 RX ORDER — LANOLIN
CREAM (ML) TOPICAL
Status: DISCONTINUED | OUTPATIENT
Start: 2020-05-01 | End: 2020-05-03 | Stop reason: HOSPADM

## 2020-05-01 RX ORDER — OXYTOCIN-SODIUM CHLORIDE 0.9% IV SOLN 30 UNIT/500ML 30-0.9/5 UT/ML-%
650 SOLUTION INTRAVENOUS ONCE
Status: COMPLETED | OUTPATIENT
Start: 2020-05-01 | End: 2020-05-01

## 2020-05-01 RX ORDER — ZOLPIDEM TARTRATE 5 MG/1
5 TABLET ORAL NIGHTLY PRN
Status: DISCONTINUED | OUTPATIENT
Start: 2020-05-01 | End: 2020-05-01 | Stop reason: HOSPADM

## 2020-05-01 RX ORDER — DIPHENHYDRAMINE HYDROCHLORIDE 50 MG/ML
12.5 INJECTION INTRAMUSCULAR; INTRAVENOUS EVERY 8 HOURS PRN
Status: DISCONTINUED | OUTPATIENT
Start: 2020-05-01 | End: 2020-05-01 | Stop reason: HOSPADM

## 2020-05-01 RX ORDER — PROMETHAZINE HYDROCHLORIDE 25 MG/ML
12.5 INJECTION, SOLUTION INTRAMUSCULAR; INTRAVENOUS EVERY 6 HOURS PRN
Status: DISCONTINUED | OUTPATIENT
Start: 2020-05-01 | End: 2020-05-01 | Stop reason: HOSPADM

## 2020-05-01 RX ORDER — IBUPROFEN 600 MG/1
600 TABLET ORAL EVERY 6 HOURS PRN
Status: DISCONTINUED | OUTPATIENT
Start: 2020-05-01 | End: 2020-05-01 | Stop reason: HOSPADM

## 2020-05-01 RX ORDER — HYDROCORTISONE 25 MG/G
1 CREAM TOPICAL AS NEEDED
Status: DISCONTINUED | OUTPATIENT
Start: 2020-05-01 | End: 2020-05-03 | Stop reason: HOSPADM

## 2020-05-01 RX ORDER — PROMETHAZINE HYDROCHLORIDE 12.5 MG/1
12.5 SUPPOSITORY RECTAL EVERY 6 HOURS PRN
Status: DISCONTINUED | OUTPATIENT
Start: 2020-05-01 | End: 2020-05-01 | Stop reason: HOSPADM

## 2020-05-01 RX ORDER — DOCUSATE SODIUM 100 MG/1
100 CAPSULE, LIQUID FILLED ORAL 2 TIMES DAILY
Status: DISCONTINUED | OUTPATIENT
Start: 2020-05-02 | End: 2020-05-03 | Stop reason: HOSPADM

## 2020-05-01 RX ORDER — ONDANSETRON 4 MG/1
4 TABLET, FILM COATED ORAL EVERY 6 HOURS PRN
Status: DISCONTINUED | OUTPATIENT
Start: 2020-05-01 | End: 2020-05-01 | Stop reason: HOSPADM

## 2020-05-01 RX ORDER — BUTORPHANOL TARTRATE 1 MG/ML
1 INJECTION, SOLUTION INTRAMUSCULAR; INTRAVENOUS
Status: DISCONTINUED | OUTPATIENT
Start: 2020-05-01 | End: 2020-05-01 | Stop reason: HOSPADM

## 2020-05-01 RX ORDER — MAGNESIUM SULFATE HEPTAHYDRATE 40 MG/ML
2 INJECTION, SOLUTION INTRAVENOUS CONTINUOUS
Status: DISPENSED | OUTPATIENT
Start: 2020-05-01 | End: 2020-05-02

## 2020-05-01 RX ORDER — SODIUM CHLORIDE 0.9 % (FLUSH) 0.9 %
1-10 SYRINGE (ML) INJECTION AS NEEDED
Status: DISCONTINUED | OUTPATIENT
Start: 2020-05-01 | End: 2020-05-01 | Stop reason: HOSPADM

## 2020-05-01 RX ORDER — ZOLPIDEM TARTRATE 5 MG/1
5 TABLET ORAL NIGHTLY PRN
Status: DISCONTINUED | OUTPATIENT
Start: 2020-05-01 | End: 2020-05-01

## 2020-05-01 RX ORDER — OXYTOCIN-SODIUM CHLORIDE 0.9% IV SOLN 30 UNIT/500ML 30-0.9/5 UT/ML-%
85 SOLUTION INTRAVENOUS ONCE
Status: COMPLETED | OUTPATIENT
Start: 2020-05-01 | End: 2020-05-01

## 2020-05-01 RX ORDER — ONDANSETRON 2 MG/ML
4 INJECTION INTRAMUSCULAR; INTRAVENOUS EVERY 6 HOURS PRN
Status: DISCONTINUED | OUTPATIENT
Start: 2020-05-01 | End: 2020-05-01 | Stop reason: HOSPADM

## 2020-05-01 RX ORDER — IBUPROFEN 600 MG/1
600 TABLET ORAL EVERY 6 HOURS PRN
Status: DISCONTINUED | OUTPATIENT
Start: 2020-05-01 | End: 2020-05-03 | Stop reason: HOSPADM

## 2020-05-01 RX ORDER — MISOPROSTOL 200 UG/1
800 TABLET ORAL AS NEEDED
Status: DISCONTINUED | OUTPATIENT
Start: 2020-05-01 | End: 2020-05-01 | Stop reason: HOSPADM

## 2020-05-01 RX ORDER — LABETALOL HYDROCHLORIDE 5 MG/ML
20-80 INJECTION, SOLUTION INTRAVENOUS
Status: DISPENSED | OUTPATIENT
Start: 2020-05-01 | End: 2020-05-02

## 2020-05-01 RX ORDER — METHYLERGONOVINE MALEATE 0.2 MG/ML
200 INJECTION INTRAVENOUS ONCE AS NEEDED
Status: DISCONTINUED | OUTPATIENT
Start: 2020-05-01 | End: 2020-05-01 | Stop reason: HOSPADM

## 2020-05-01 RX ORDER — TRISODIUM CITRATE DIHYDRATE AND CITRIC ACID MONOHYDRATE 500; 334 MG/5ML; MG/5ML
30 SOLUTION ORAL ONCE
Status: DISCONTINUED | OUTPATIENT
Start: 2020-05-01 | End: 2020-05-01 | Stop reason: HOSPADM

## 2020-05-01 RX ORDER — SODIUM CHLORIDE 0.9 % (FLUSH) 0.9 %
3 SYRINGE (ML) INJECTION EVERY 12 HOURS SCHEDULED
Status: DISCONTINUED | OUTPATIENT
Start: 2020-05-01 | End: 2020-05-01 | Stop reason: HOSPADM

## 2020-05-01 RX ORDER — ACETAMINOPHEN 325 MG/1
650 TABLET ORAL EVERY 4 HOURS PRN
Status: DISCONTINUED | OUTPATIENT
Start: 2020-05-01 | End: 2020-05-03 | Stop reason: HOSPADM

## 2020-05-01 RX ORDER — SODIUM CHLORIDE, SODIUM LACTATE, POTASSIUM CHLORIDE, CALCIUM CHLORIDE 600; 310; 30; 20 MG/100ML; MG/100ML; MG/100ML; MG/100ML
125 INJECTION, SOLUTION INTRAVENOUS CONTINUOUS
Status: DISCONTINUED | OUTPATIENT
Start: 2020-05-01 | End: 2020-05-01

## 2020-05-01 RX ORDER — HYDROCODONE BITARTRATE AND ACETAMINOPHEN 5; 325 MG/1; MG/1
1 TABLET ORAL EVERY 4 HOURS PRN
Status: DISCONTINUED | OUTPATIENT
Start: 2020-05-01 | End: 2020-05-01 | Stop reason: HOSPADM

## 2020-05-01 RX ORDER — ONDANSETRON 4 MG/1
4 TABLET, FILM COATED ORAL EVERY 6 HOURS PRN
Status: DISCONTINUED | OUTPATIENT
Start: 2020-05-01 | End: 2020-05-01

## 2020-05-01 RX ORDER — LIDOCAINE HYDROCHLORIDE AND EPINEPHRINE 15; 5 MG/ML; UG/ML
INJECTION, SOLUTION EPIDURAL AS NEEDED
Status: DISCONTINUED | OUTPATIENT
Start: 2020-05-01 | End: 2020-05-01 | Stop reason: SURG

## 2020-05-01 RX ORDER — FAMOTIDINE 10 MG/ML
20 INJECTION, SOLUTION INTRAVENOUS ONCE AS NEEDED
Status: DISCONTINUED | OUTPATIENT
Start: 2020-05-01 | End: 2020-05-01 | Stop reason: HOSPADM

## 2020-05-01 RX ADMIN — LIDOCAINE HYDROCHLORIDE AND EPINEPHRINE 3 ML: 15; 5 INJECTION, SOLUTION EPIDURAL at 13:23

## 2020-05-01 RX ADMIN — Medication 15 ML/HR: at 13:30

## 2020-05-01 RX ADMIN — LIDOCAINE HYDROCHLORIDE AND EPINEPHRINE 1 ML: 15; 5 INJECTION, SOLUTION EPIDURAL at 13:26

## 2020-05-01 RX ADMIN — LABETALOL 20 MG/4 ML (5 MG/ML) INTRAVENOUS SYRINGE 20 MG: at 22:09

## 2020-05-01 RX ADMIN — SODIUM CHLORIDE, POTASSIUM CHLORIDE, SODIUM LACTATE AND CALCIUM CHLORIDE 2000 ML/HR: 600; 310; 30; 20 INJECTION, SOLUTION INTRAVENOUS at 13:00

## 2020-05-01 RX ADMIN — FENTANYL CITRATE 100 MCG: 50 INJECTION, SOLUTION INTRAMUSCULAR; INTRAVENOUS at 13:26

## 2020-05-01 RX ADMIN — OXYTOCIN 85 ML/HR: 10 INJECTION, SOLUTION INTRAMUSCULAR; INTRAVENOUS at 19:48

## 2020-05-01 RX ADMIN — IBUPROFEN 600 MG: 600 TABLET, FILM COATED ORAL at 19:47

## 2020-05-01 RX ADMIN — MAGNESIUM SULFATE HEPTAHYDRATE 2 G/HR: 40 INJECTION, SOLUTION INTRAVENOUS at 22:17

## 2020-05-01 RX ADMIN — LABETALOL 20 MG/4 ML (5 MG/ML) INTRAVENOUS SYRINGE 40 MG: at 22:19

## 2020-05-01 RX ADMIN — OXYTOCIN 650 ML/HR: 10 INJECTION INTRAVENOUS at 19:14

## 2020-05-01 RX ADMIN — SODIUM CHLORIDE, POTASSIUM CHLORIDE, SODIUM LACTATE AND CALCIUM CHLORIDE 125 ML/HR: 600; 310; 30; 20 INJECTION, SOLUTION INTRAVENOUS at 11:34

## 2020-05-01 RX ADMIN — ROPIVACAINE HYDROCHLORIDE 10 ML: 5 INJECTION, SOLUTION EPIDURAL; INFILTRATION; PERINEURAL at 13:29

## 2020-05-01 RX ADMIN — LABETALOL 20 MG/4 ML (5 MG/ML) INTRAVENOUS SYRINGE 20 MG: at 23:18

## 2020-05-01 NOTE — PROGRESS NOTES
"05/01/20  15:05  Dolores Brito      ASSESSMENTS: She is now comfortable with epidural. Baby active.    /83   Pulse 55   Temp 98.1 °F (36.7 °C) (Oral)   Ht 160 cm (63\")   Wt 73.5 kg (162 lb)   Breastfeeding Yes   BMI 28.70 kg/m²     Fetal Heart Rate Assessment   Method: Fetal HR Assessment Method: external   Beats/min: Fetal HR (beats/min): 125   Baseline: Fetal Heart Baseline Rate: normal range   Varibility: Fetal HR Variability: moderate (amplitude range 6 to 25 bpm)   Accels: Fetal HR Accelerations: greater than/equal to 15 bpm, lasting at least 15 seconds   Decels: Fetal HR Decelerations: absent   Tracing Category:  1     Uterine Assessment   Method: Method: external tocotransducer   Frequency (min): Contraction Frequency (Minutes): 3-5   Ctx Count in 10 min:     Duration:     Intensity: Contraction Intensity: moderate by palpation   Intensity by IUPC:     Resting Tone: Uterine Resting Tone: soft by palpation   Resting Tone by IUPC:     [place] Units:                                Presentation: Vertex   Cervix: Exam by: Method: sterile exam per FLAVIA Palafox CNM   Dilation: Cervical Dilation (cm): 4   Effacement: Cervical Effacement: 90%   Station:  Membranes: Fetal Station: -1   AROM fluid clear, normal amount            Lab Results   Component Value Date    WBC 10.43 05/01/2020    HGB 12.1 05/01/2020    HCT 38.9 05/01/2020    MCV 84.4 05/01/2020     05/01/2020    URICACID 7.4 (H) 05/01/2020    AST 33 (H) 05/01/2020    ALT 13 05/01/2020     (H) 05/01/2020     Results from last 7 days   Lab Units 05/01/20  1135   ABO TYPING  A   RH TYPING  Positive   ANTIBODY SCREEN  Negative       PLAN: Recheck 2 hours.  Pitocin augmentation if no progress.     Brigid Palafox CNM  15:05  05/01/20  "

## 2020-05-01 NOTE — L&D DELIVERY NOTE
[facility]  Vaginal Delivery Note    Delivery     Delivery: Vaginal, Spontaneous     YOB: 2021    Time of Birth:  Gestational Age 7:10 PM   38w4d     Anesthesia: Epidural     Delivering clinician: Brigid Palafox CNM   Forceps?   No   Vacuum? No    Shoulder dystocia present: No        Delivery narrative:  Niema pushed well to spontaneous vaginal delivery of a viable male infant in OA position,over intact perineum.  Mouth and nose bulb suctioned after delivery of head.  Shoulders and body delivered atraumatically with next contraction. Infant with lust cry placed on mothers abdomen and dried.  Cord double clamped and cut.  Cord blood and cord segment obtained.  Spontaneous delivery of a visually intact placenta with 3 vessel cord. Fundus firm lochia small amount rubra.  Perineal inspection reveals a left periurethral abrasion and perineal abrasions that are hemostatic.  Mother and son stable in the immediate PP period.    Infant    Findings: male  infant     Infant observations: Weight: 7# 15 oz  Length:    in  Observations/Comments:         Apgars: 8   @ 1 minute /    9   @ 5 minutes   Infant Name: Rosemarynis     Placenta, Cord, and Fluid    Placenta delivered  Spontaneous  at   5/1/2020  7:14 PM     Cord: 3 vessels  present.   Nuchal Cord?  no   Cord blood obtained: Yes    Cord gases obtained:  No      Repair    Episiotomy: None     No    Lacerations: No   Estimated Blood Loss: Est. Blood Loss (mL): 150 mL(Filed from Delivery Summary) (05/01/20 1910)           Complications  none    Disposition  Mother to Mother Baby/Postpartum  in stable condition currently.  Baby to remains with mom  in stable condition currently.      Brigid Palafox CNM  05/01/20  19:35

## 2020-05-01 NOTE — ANESTHESIA PREPROCEDURE EVALUATION
Anesthesia Evaluation     Patient summary reviewed and Nursing notes reviewed   NPO Solid Status: > 6 hours  NPO Liquid Status: > 6 hours           Airway   Mallampati: II  TM distance: >3 FB  Neck ROM: full  Dental      Pulmonary - negative pulmonary ROS   Cardiovascular - negative cardio ROS        Neuro/Psych- negative ROS  GI/Hepatic/Renal/Endo - negative ROS     Musculoskeletal (-) negative ROS    Abdominal    Substance History - negative use     OB/GYN    (+) Pregnant,         Other                        Anesthesia Plan    ASA 2     epidural       Anesthetic plan, all risks, benefits, and alternatives have been provided, discussed and informed consent has been obtained with: patient.

## 2020-05-01 NOTE — ANESTHESIA PROCEDURE NOTES
Labor Epidural      Patient reassessed immediately prior to procedure    Patient location during procedure: OB  Performed By  CRNA: Maribell Byrne CRNA  Preanesthetic Checklist  Completed: patient identified, surgical consent, pre-op evaluation, timeout performed, IV checked, risks and benefits discussed and monitors and equipment checked  Prep:  Pt Position:sitting  Sterile Tech:cap, gloves, mask and sterile barrier  Prep:DuraPrep  Monitoring:blood pressure monitoring  Epidural Block Procedure:  Approach:midline  Guidance:palpation technique  Location:L2-L3  Needle Type:Tuohy  Loss of Resistance Medium: saline  Loss of Resistance: 6cm  Cath Depth at skin:9 cm  Paresthesia: none  Aspiration:negative  Test Dose:negative  Number of Attempts: 1  Post Assessment:  Dressing:occlusive dressing applied and secured with tape  Pt Tolerance:patient tolerated the procedure well with no apparent complications  Complications:no

## 2020-05-01 NOTE — H&P
[facility]  Obstetric History and Physical    Chief Complaint   Patient presents with   • Contractions       Subjective     Patient is a 24 y.o. female  currently at 38w4d, who presents with complaints of contractions.  Patient presents with regular uterine contractions and is documented to have cervical change on serial examinations.  She denies vaginal bleeding or leakage of fluid.  She reports a benign prenatal course.    Her prenatal care is reportedly unremarkable. Her previous obstetric/gynecological history is notable for prior vaginal delivery following 17 hours of labor per her history.    The following portions of the patients history were reviewed and updated as appropriate: current medications, allergies, past medical history, past surgical history, past family history, past social history and problem list .        Prenatal Information:   Maternal Prenatal Labs  Blood Type No results found for: ABO   Rh Status No results found for: RH   Antibody Screen No results found for: ABSCRN   Gonnorhea No results found for: GCCX   Chlamydia No results found for: CLAMYDCU   RPR No results found for: RPR   Syphilis Antibody No results found for: SYPHILIS   Rubella No results found for: RUBELLAIGGIN   Hepatitis B Surface Antigen No results found for: HEPBSAG   HIV-1 Antibody No results found for: LABHIV1   Hepatitis C Antibody No results found for: HEPCAB   Rapid Urin Drug Screen No results found for: AMPMETHU, BARBITSCNUR, LABBENZSCN, LABMETHSCN, LABOPIASCN, THCURSCR, COCAINEUR, AMPHETSCREEN, PROPOXSCN, BUPRENORSCNU, METAMPSCNUR, OXYCODONESCN, TRICYCLICSCN   Group B Strep Culture No results found for: GBSANTIGEN           External Prenatal Results     Pregnancy Outside Results - Transcribed From Office Records - See Scanned Records For Details     Test Value Date Time    Hgb 8.2 g/dL 10/09/17 1140    Hct 24.7 % 10/09/17 1140    ABO A  10/08/17 0357    Rh Positive  10/08/17 0357    Antibody Screen Negative   10/08/17 0357    Glucose Fasting  mg/dL 17     Glucose Tolerance Test 1 hour       Glucose Tolerance Test 3 hour       Gonorrhea (discrete) Negative  17    Chlamydia (discrete) Negative  17    RPR Non-Reactive  17     VDRL       Syphilis Antibody       Rubella Non-Immune  17     HBsAg Negative  17     Herpes Simplex Virus PCR       Herpes Simplex VIrus Culture       HIV       Hep C RNA Quant PCR       Hep C Antibody       AFP       Group B Strep Negative  17     GBS Susceptibility to Clindamycin       GBS Susceptibility to Erythromycin       Fetal Fibronectin       Genetic Testing, Maternal Blood             Drug Screening     Test Value Date Time    Urine Drug Screen negative  17     Amphetamine Screen Negative  17    Barbiturate Screen Negative  17    Benzodiazepine Screen Negative  17 2235    Methadone Screen Negative  17 223    Phencyclidine Screen Negative  17 223    Opiates Screen Negative  17 2235    THC Screen Negative  17    Cocaine Screen       Propoxyphene Screen Negative  17 2235    Buprenorphine Screen Negative  17 2235    Methamphetamine Screen       Oxycodone Screen Negative  17 2235    Tricyclic Antidepressants Screen Negative  17 223                  Past OB History:       OB History    Para Term  AB Living   2 1 1 0 0 1   SAB TAB Ectopic Molar Multiple Live Births   0 0 0 0 0 1      # Outcome Date GA Lbr Malcom/2nd Weight Sex Delivery Anes PTL Lv   2 Current            1 Term 10/08/17 38w5d / 00:37 3345 g (7 lb 6 oz) F Vag-Spont None N LILY      Name: SHEREEN EVANS      Apgar1: 8  Apgar5: 9       Past Medical History:  Past Medical History:   Diagnosis Date   • Endometriosis    • Gestational hypertension     No medications @ this time      Past Surgical History Past Surgical History:   Procedure Laterality Date   • APPENDECTOMY     •  DILATATION AND CURETTAGE N/A 10/9/2017    Procedure: DILATATION AND CURETTAGE;  Surgeon: Madisyn Reed MD;  Location: [facility] LABOR DELIVERY;  Service:    • EXPLORATORY LAPAROTOMY     • WISDOM TOOTH EXTRACTION        Family History: History reviewed. No pertinent family history.   Social History:  reports that she has never smoked. She has never used smokeless tobacco.   reports that she does not drink alcohol.   reports that she does not use drugs.   Allergies: Patient has no known allergies.  Current Medications:          No current facility-administered medications on file prior to encounter.      Current Outpatient Medications on File Prior to Encounter   Medication Sig Dispense Refill   • ferrous sulfate 325 (65 FE) MG tablet Take 325 mg by mouth Daily With Breakfast.     • Prenatal Vit-Fe Fumarate-FA (PRENATAL 27-1) 27-1 MG tablet tablet Take 1 tablet by mouth Daily.     • amoxicillin (AMOXIL) 500 MG capsule Take 1 capsule by mouth 2 (Two) Times a Day. 14 capsule 0   • ibuprofen (ADVIL,MOTRIN) 600 MG tablet Take 1 tablet by mouth Every 6 (Six) Hours As Needed for Mild Pain . 30 tablet 1   • labetalol (NORMODYNE) 200 MG tablet Take 1 tablet by mouth 3 (Three) Times a Day. 120 tablet 1   • NIFEdipine XL (ADALAT CC) 30 MG 24 hr tablet Take 1 tablet by mouth 2 (Two) Times a Day. 60 tablet 3       General ROS: Pertinent items are noted in HPI, all other systems reviewed and negative    Objective       Vital Signs Range for the last 24 hours  Temperature:     Temp Source:     BP: BP: (137-157)/() 139/83   Pulse: Heart Rate:  [58-75] 68   Respirations:     SPO2:     O2 Amount (l/min):     O2 Devices     Weight: Weight:  [73.5 kg (162 lb)] 73.5 kg (162 lb)     Physical Examination: General appearance - alert, well appearing, and in no distress, oriented to person, place, and time and normal appearing weight  Mental status - alert, oriented to person, place, and time, normal mood, behavior, speech, dress, motor  activity, and thought processes  Eyes - pupils equal and reactive, extraocular eye movements intact, sclera anicteric  Neck - supple, no significant adenopathy, thyroid exam: thyroid is normal in size without nodules or tenderness  Chest - clear to auscultation, no wheezes, rales or rhonchi, symmetric air entry  Heart - normal rate, regular rhythm, normal S1, S2, no murmurs, rubs, clicks or gallops  Abdomen-soft, nontender, nondistended, no masses or organomegaly   Abdomen, Non-Tender  Pelvic - VULVA: normal appearing vulva with no masses, tenderness or lesions, CERVIX: 3-4 cm / 80%/-2 station.  Cephalic presentation.  Intact membranes.  Neurological - alert, oriented, normal speech, no focal findings or movement disorder noted, DTR's normal and symmetric  Extremities - pedal edema 1 +    Fetal Heart Rate Assessment: Category 1 tracing.  Contractions noted every 2-3 minutes.  Baseline fetal heart rate 125-135 bpm          Laboratory Results:   Lab Results   Component Value Date    ALKPHOS 150 (H) 10/08/2017    ALT 12 10/08/2017    AST 19 10/08/2017    CREATININE 0.60 10/08/2017    BILITOT 0.3 10/08/2017     10/08/2017    URICACID 5.5 10/08/2017       No results found for: WBC, RBC, HGB, HCT, MCV, MCH, MCHC, RDW, RDWSD, MPV, PLT, NEUTRORELPCT, LYMPHORELPCT, MONORELPCT, EOSRELPCT, BASORELPCT, AUTOIGPER, NEUTROABS, LYMPHSABS, MONOSABS, EOSABS, BASOSABS, AUTOIGNUM, NRBC          Brief Urine Lab Results     None            Radiology Review: No new studies  Other Studies: CBC, PEP, type and screen.    Assessment/Plan       * No active hospital problems. *        Assessment:  1. Term intrauterine pregnancy at 38 weeks 4 days gestation in spontaneous labor.  2. Mild range blood pressure elevation consistent with gestational hypertension.    Plan:  1. Admit and deliver.  2. If develops severe range pressures will initiate magnesium sulfate for seizure prophylaxis.  3. Dr. Varela notified.     Total time spent  "today with Niema  was 25 minutes (level 2).  Of this time, > 50% was spent face-to-face time coordinating care, answering her questions and counseling regarding pathophysiology of her presenting problem along with plans for any diagnostic work-up and treatment.        Bala Deleon \"Angelita\" DUSTIN Marquez MD  5/1/2020  11:22    "

## 2020-05-02 LAB
ALP SERPL-CCNC: 124 U/L (ref 39–117)
ALT SERPL W P-5'-P-CCNC: 17 U/L (ref 1–33)
AST SERPL-CCNC: 34 U/L (ref 1–32)
BASOPHILS # BLD AUTO: 0.01 10*3/MM3 (ref 0–0.2)
BASOPHILS NFR BLD AUTO: 0.1 % (ref 0–1.5)
BILIRUB SERPL-MCNC: 0.2 MG/DL (ref 0.2–1.2)
CREAT BLD-MCNC: 0.77 MG/DL (ref 0.57–1)
DEPRECATED RDW RBC AUTO: 46.3 FL (ref 37–54)
EOSINOPHIL # BLD AUTO: 0.02 10*3/MM3 (ref 0–0.4)
EOSINOPHIL NFR BLD AUTO: 0.2 % (ref 0.3–6.2)
ERYTHROCYTE [DISTWIDTH] IN BLOOD BY AUTOMATED COUNT: 15.5 % (ref 12.3–15.4)
HCT VFR BLD AUTO: 37.4 % (ref 34–46.6)
HGB BLD-MCNC: 11.9 G/DL (ref 12–15.9)
IMM GRANULOCYTES # BLD AUTO: 0.05 10*3/MM3 (ref 0–0.05)
IMM GRANULOCYTES NFR BLD AUTO: 0.5 % (ref 0–0.5)
LDH SERPL-CCNC: 290 U/L (ref 135–214)
LYMPHOCYTES # BLD AUTO: 2.33 10*3/MM3 (ref 0.7–3.1)
LYMPHOCYTES NFR BLD AUTO: 21.8 % (ref 19.6–45.3)
MCH RBC QN AUTO: 26.4 PG (ref 26.6–33)
MCHC RBC AUTO-ENTMCNC: 31.8 G/DL (ref 31.5–35.7)
MCV RBC AUTO: 83.1 FL (ref 79–97)
MONOCYTES # BLD AUTO: 0.61 10*3/MM3 (ref 0.1–0.9)
MONOCYTES NFR BLD AUTO: 5.7 % (ref 5–12)
NEUTROPHILS # BLD AUTO: 7.67 10*3/MM3 (ref 1.7–7)
NEUTROPHILS NFR BLD AUTO: 71.7 % (ref 42.7–76)
NRBC BLD AUTO-RTO: 0 /100 WBC (ref 0–0.2)
PLATELET # BLD AUTO: 144 10*3/MM3 (ref 140–450)
PMV BLD AUTO: 13.2 FL (ref 6–12)
RBC # BLD AUTO: 4.5 10*6/MM3 (ref 3.77–5.28)
URATE SERPL-MCNC: 7.6 MG/DL (ref 2.4–5.7)
WBC NRBC COR # BLD: 10.69 10*3/MM3 (ref 3.4–10.8)

## 2020-05-02 PROCEDURE — 84550 ASSAY OF BLOOD/URIC ACID: CPT | Performed by: OBSTETRICS & GYNECOLOGY

## 2020-05-02 PROCEDURE — 84450 TRANSFERASE (AST) (SGOT): CPT | Performed by: OBSTETRICS & GYNECOLOGY

## 2020-05-02 PROCEDURE — 82565 ASSAY OF CREATININE: CPT | Performed by: OBSTETRICS & GYNECOLOGY

## 2020-05-02 PROCEDURE — 85025 COMPLETE CBC W/AUTO DIFF WBC: CPT | Performed by: ADVANCED PRACTICE MIDWIFE

## 2020-05-02 PROCEDURE — 83615 LACTATE (LD) (LDH) ENZYME: CPT | Performed by: OBSTETRICS & GYNECOLOGY

## 2020-05-02 PROCEDURE — 84075 ASSAY ALKALINE PHOSPHATASE: CPT | Performed by: OBSTETRICS & GYNECOLOGY

## 2020-05-02 PROCEDURE — 84460 ALANINE AMINO (ALT) (SGPT): CPT | Performed by: OBSTETRICS & GYNECOLOGY

## 2020-05-02 PROCEDURE — 25010000003 MAGNESIUM SULFATE 20 GM/500ML SOLUTION: Performed by: OBSTETRICS & GYNECOLOGY

## 2020-05-02 PROCEDURE — 25010000003 MAGNESIUM SULFATE 20 GM/500ML SOLUTION: Performed by: ADVANCED PRACTICE MIDWIFE

## 2020-05-02 PROCEDURE — 82247 BILIRUBIN TOTAL: CPT | Performed by: OBSTETRICS & GYNECOLOGY

## 2020-05-02 RX ORDER — HYDROCODONE BITARTRATE AND ACETAMINOPHEN 5; 325 MG/1; MG/1
1 TABLET ORAL EVERY 6 HOURS PRN
Status: DISPENSED | OUTPATIENT
Start: 2020-05-02 | End: 2020-05-03

## 2020-05-02 RX ORDER — NIFEDIPINE 10 MG/1
10 CAPSULE ORAL
Status: DISCONTINUED | OUTPATIENT
Start: 2020-05-02 | End: 2020-05-03 | Stop reason: HOSPADM

## 2020-05-02 RX ORDER — DEXTROSE AND SODIUM CHLORIDE 5; .2 G/100ML; G/100ML
75 INJECTION, SOLUTION INTRAVENOUS CONTINUOUS
Status: DISCONTINUED | OUTPATIENT
Start: 2020-05-02 | End: 2020-05-03 | Stop reason: HOSPADM

## 2020-05-02 RX ORDER — VITAMIN A ACETATE, BETA CAROTENE, ASCORBIC ACID, CHOLECALCIFEROL, .ALPHA.-TOCOPHEROL ACETATE, DL-, THIAMINE MONONITRATE, RIBOFLAVIN, NIACINAMIDE, PYRIDOXINE HYDROCHLORIDE, FOLIC ACID, CYANOCOBALAMIN, CALCIUM CARBONATE, FERROUS FUMARATE, ZINC OXIDE, CUPRIC OXIDE 3080; 12; 120; 400; 1; 1.84; 3; 20; 22; 920; 25; 200; 27; 10; 2 [IU]/1; UG/1; MG/1; [IU]/1; MG/1; MG/1; MG/1; MG/1; MG/1; [IU]/1; MG/1; MG/1; MG/1; MG/1; MG/1
1 TABLET, FILM COATED ORAL DAILY
Status: DISCONTINUED | OUTPATIENT
Start: 2020-05-02 | End: 2020-05-03 | Stop reason: RX

## 2020-05-02 RX ADMIN — PRENATAL WITH FERROUS FUM AND FOLIC ACID 1 TABLET: 3080; 920; 120; 400; 22; 1.84; 3; 20; 10; 1; 12; 200; 27; 25; 2 TABLET ORAL at 10:17

## 2020-05-02 RX ADMIN — NIFEDIPINE 10 MG: 10 CAPSULE ORAL at 16:01

## 2020-05-02 RX ADMIN — LABETALOL 20 MG/4 ML (5 MG/ML) INTRAVENOUS SYRINGE 40 MG: at 00:15

## 2020-05-02 RX ADMIN — DEXTROSE AND SODIUM CHLORIDE 75 ML/HR: 5; 200 INJECTION, SOLUTION INTRAVENOUS at 14:09

## 2020-05-02 RX ADMIN — MAGNESIUM SULFATE HEPTAHYDRATE 2 G/HR: 40 INJECTION, SOLUTION INTRAVENOUS at 17:08

## 2020-05-02 RX ADMIN — DEXTROSE AND SODIUM CHLORIDE 75 ML/HR: 5; 200 INJECTION, SOLUTION INTRAVENOUS at 01:25

## 2020-05-02 RX ADMIN — NIFEDIPINE 10 MG: 10 CAPSULE ORAL at 20:05

## 2020-05-02 RX ADMIN — NIFEDIPINE 10 MG: 10 CAPSULE ORAL at 05:40

## 2020-05-02 RX ADMIN — HYDROCODONE BITARTRATE AND ACETAMINOPHEN 1 TABLET: 5; 325 TABLET ORAL at 17:09

## 2020-05-02 RX ADMIN — NIFEDIPINE 10 MG: 10 CAPSULE ORAL at 11:57

## 2020-05-02 RX ADMIN — HYDROCODONE BITARTRATE AND ACETAMINOPHEN 1 TABLET: 5; 325 TABLET ORAL at 10:12

## 2020-05-02 RX ADMIN — ACETAMINOPHEN 650 MG: 325 TABLET, FILM COATED ORAL at 04:54

## 2020-05-02 RX ADMIN — MAGNESIUM SULFATE HEPTAHYDRATE 2 G/HR: 40 INJECTION, SOLUTION INTRAVENOUS at 04:55

## 2020-05-02 RX ADMIN — DOCUSATE SODIUM 100 MG: 100 CAPSULE, LIQUID FILLED ORAL at 20:05

## 2020-05-02 RX ADMIN — DOCUSATE SODIUM 100 MG: 100 CAPSULE, LIQUID FILLED ORAL at 10:22

## 2020-05-02 RX ADMIN — NIFEDIPINE 10 MG: 10 CAPSULE ORAL at 23:55

## 2020-05-02 NOTE — PROGRESS NOTES
5/2/2020  PPD #1    Subjective   Niema feels well.  Patient describes her lochia as less than menses.  Pain is well controlled  She has a mild HA and blurry vision since starting magnesium. She denies CP, SOA, RUQ/EG pain or N/V.     Objective   Temp: Temp:  [97.9 °F (36.6 °C)-98.6 °F (37 °C)] 98 °F (36.7 °C) Temp src: Oral   BP: BP: (115-186)/() 140/87        Pulse: Heart Rate:  [] 86  RR: Resp:  [12-18] 16    General:  No acute distress   Abdomen: Fundus firm and beneath umbilicus   Pelvis: deferred     Lab Results   Component Value Date    WBC 10.69 05/02/2020    HGB 11.9 (L) 05/02/2020    HCT 37.4 05/02/2020    MCV 83.1 05/02/2020     05/02/2020    HEPBSAG Negative 04/14/2017    AST 34 (H) 05/02/2020    URICACID 7.6 (H) 05/02/2020       Assessment  1. PPD# 1 after vaginal delivery  2. PP pre-eclampsia    Plan  1. Continue magnesium sulfate x 24h, off at 2151 today; no signs/sx of magnesium toxicity, UOP adequate  2. BPs improved on procardia 10mg PO q4h; s/p labetalol IV x 1 overnight (last at 0015)  3. Continue routine postpartum care  4. Can transfer to [facility] s/p discontinuation of magnesium sulfate if BPs stable  5. Consideration for discharge home tomorrow if BPs normal and no signs/sx of severe disease      This note has been electronically signed.    Margaret Varela MD  11:48  May 2, 2020

## 2020-05-02 NOTE — PAYOR COMM NOTE
"Paulina Shelby RN   Phone [phone]  Fax [phone]      Dolores Brito (24 y.o. Female)     Date of Birth Social Security Number Address Home Phone MRN    1995  [id number] [facility] [address] [phone] [id number]    [employer] Marital Status          [employer]        Admission Date Admission Type Admitting Provider Attending Provider Department, Room/Bed    5/1/20 Elective Margaret Varela MD Schadler, Laure Ann, CNM [facility] ANTEPARTUM, N324/1    Discharge Date Discharge Disposition Discharge Destination                       Attending Provider:  Brigid Palafox CNM    Allergies:  No Known Allergies    Isolation:  None   Infection:  None   Code Status:  CPR    Ht:  160 cm (63\")   Wt:  73.5 kg (162 lb)    Admission Cmt:  None   Principal Problem:  None                Active Insurance as of 5/1/2020     Primary Coverage     Payor Plan Insurance Group Employer/Plan Group    [place] [id number]     Payor Plan Address Payor Plan Phone Number Payor Plan Fax Number Effective Dates    [employer] Claims Office - [address] [address] [phone]  1/1/2020 - None Entered    [facility] [id number]       Subscriber Name Subscriber Birth Date Member ID       DOLORES BRITO 1995 [id number]                 Emergency Contacts      (Rel.) Home Phone Work Phone Mobile Phone    Ab Phyllis Locke (Spouse) [phone] -- [phone]            Brigid Palafox CNM   Midwife   Obstetrics   L&D Delivery Note   Signed   Date of Service:  05/01/20 1928   Creation Time:  05/01/20 1928            Signed             Show:Clear all  [x]Manual[x]Template[]Copied    Added by:  [x]Brigid Palafox CNM    []Elaina for details  [facility]  Vaginal Delivery Note     Delivery      Delivery: Vaginal, Spontaneous     YOB: 2021    Time of Birth:  Gestational Age 7:10 PM   38w4d      Anesthesia: Epidural     Delivering clinician: " Brigid Palafox CNM   Forceps?   No   Vacuum? No    Shoulder dystocia present: No                   Delivery narrative:  Niema pushed well to spontaneous vaginal delivery of a viable male infant in OA position,over intact perineum.  Mouth and nose bulb suctioned after delivery of head.  Shoulders and body delivered atraumatically with next contraction. Infant with lust cry placed on mothers abdomen and dried.  Cord double clamped and cut.  Cord blood and cord segment obtained.  Spontaneous delivery of a visually intact placenta with 3 vessel cord. Fundus firm lochia small amount rubra.  Perineal inspection reveals a left periurethral abrasion and perineal abrasions that are hemostatic.  Mother and son stable in the immediate PP period.     Infant     Findings: male  infant      Infant observations: Weight: 7# 15 oz  Length:    in  Observations/Comments:         Apgars: 8   @ 1 minute /     9   @ 5 minutes   Infant Name: Francine            Placenta, Cord, and Fluid      Placenta delivered  Spontaneous  at   5/1/2020  7:14 PM      Cord: 3 vessels  present.   Nuchal Cord?  no    Cord blood obtained: Yes     Cord gases obtained:             No        Repair     Episiotomy: None     No    Lacerations: No   Estimated Blood Loss: Est. Blood Loss (mL): 150 mL(Filed from Delivery Summary) (05/01/20 1910)               Complications  none     Disposition  Mother to Mother Baby/Postpartum  in stable condition currently.  Baby to remains with mom  in stable condition currently.        Brigid Palafox CNM  05/01/20  19:35                  Routing History

## 2020-05-03 VITALS
WEIGHT: 162 LBS | BODY MASS INDEX: 28.7 KG/M2 | OXYGEN SATURATION: 99 % | HEIGHT: 63 IN | DIASTOLIC BLOOD PRESSURE: 77 MMHG | SYSTOLIC BLOOD PRESSURE: 133 MMHG | RESPIRATION RATE: 16 BRPM | TEMPERATURE: 98.9 F | HEART RATE: 86 BPM

## 2020-05-03 PROCEDURE — 90471 IMMUNIZATION ADMIN: CPT | Performed by: ADVANCED PRACTICE MIDWIFE

## 2020-05-03 PROCEDURE — 90707 MMR VACCINE SC: CPT | Performed by: ADVANCED PRACTICE MIDWIFE

## 2020-05-03 PROCEDURE — 25010000002 MEASLES, MUMPS & RUBELLA VAC RECONSTITUTED SOLUTION: Performed by: ADVANCED PRACTICE MIDWIFE

## 2020-05-03 RX ORDER — IBUPROFEN 600 MG/1
600 TABLET ORAL EVERY 6 HOURS PRN
Qty: 60 TABLET | Refills: 1 | Status: CANCELLED | OUTPATIENT
Start: 2020-05-03

## 2020-05-03 RX ORDER — IBUPROFEN 600 MG/1
600 TABLET ORAL EVERY 6 HOURS PRN
Qty: 60 TABLET | Refills: 1 | Status: SHIPPED | OUTPATIENT
Start: 2020-05-03

## 2020-05-03 RX ORDER — PRENATAL VIT/IRON FUM/FOLIC AC 27MG-0.8MG
1 TABLET ORAL DAILY
Status: DISCONTINUED | OUTPATIENT
Start: 2020-05-03 | End: 2020-05-03 | Stop reason: HOSPADM

## 2020-05-03 RX ADMIN — NIFEDIPINE 10 MG: 10 CAPSULE ORAL at 12:34

## 2020-05-03 RX ADMIN — PRENATAL VITAMINS-IRON FUMARATE 27 MG IRON-FOLIC ACID 0.8 MG TABLET 1 TABLET: at 10:20

## 2020-05-03 RX ADMIN — NIFEDIPINE 10 MG: 10 CAPSULE ORAL at 08:26

## 2020-05-03 RX ADMIN — NIFEDIPINE 10 MG: 10 CAPSULE ORAL at 03:28

## 2020-05-03 RX ADMIN — MEASLES, MUMPS, AND RUBELLA VIRUS VACCINE LIVE 0.5 ML: 1000; 12500; 1000 INJECTION, POWDER, LYOPHILIZED, FOR SUSPENSION SUBCUTANEOUS at 10:20

## 2020-05-03 RX ADMIN — DOCUSATE SODIUM 100 MG: 100 CAPSULE, LIQUID FILLED ORAL at 08:26

## 2020-05-03 NOTE — PROGRESS NOTES
5/3/2020  PPD #2    Subjective   Niema feels well.  Patient describes her lochia as less than menses.  Pain is well controlled  She is breastfeeding.   She denies HA, vision changes, and epigastric pain.      Objective   Temp: Temp:  [97.8 °F (36.6 °C)-98.9 °F (37.2 °C)] 98.9 °F (37.2 °C) Temp src: Oral   BP: BP: (0-141)/(0-94) 133/77        Pulse: Heart Rate:  [] 86  RR: Resp:  [16] 16    General:  No acute distress   Abdomen: Fundus firm and beneath umbilicus   Pelvis: deferred     Lab Results   Component Value Date    WBC 10.69 05/02/2020    HGB 11.9 (L) 05/02/2020    HCT 37.4 05/02/2020    MCV 83.1 05/02/2020     05/02/2020    HEPBSAG Negative 04/14/2017    AST 34 (H) 05/02/2020    URICACID 7.6 (H) 05/02/2020       Assessment  1. PPD# 2 after vaginal delivery  2.   PP preeclampsia s/p Magnesium sulfate post delivery    Plan  1. Discharge to home  2. Follow up with [facility]  in 1 week for Tele health visit for BP check  3. Discussed symptoms of PP preeclampsia for which to seek medical attention  4. Patient to obtain BP cuff for home monitoring. Call to report persistent readings >150/100  5. Prescriptions for Motrin  prescribed and advised on weaning.  6. Advised no tampons, intercourse, or tub baths for 6 weeks.       This note has been electronically signed.    Ben John CNM  12:00  May 3, 2020

## 2020-05-03 NOTE — PLAN OF CARE
Problem: Patient Care Overview  Goal: Plan of Care Review  Outcome: Ongoing (interventions implemented as appropriate)  Flowsheets (Taken 5/3/2020 2223)  Outcome Summary: pain contrlled, vs wnl, lochia wnl, bfing well

## 2020-05-03 NOTE — LACTATION NOTE
05/02/20 1335   Maternal Information   Date of Referral 05/02/20   Person Making Referral nurse;patient   Maternal Reason for Referral   (mom on [facility] for mag therapy)   Equipment Type   Breast Pump Type double electric, personal  (discussed pump rx--mom wants to pump tomorrow)   Reproductive Interventions   Breastfeeding Assistance support offered;feeding on demand promoted;feeding cue recognition promoted   Breastfeeding Support diary/feeding log utilized;encouragement provided;lactation counseling provided   Breast Pumping   Breast Pumping Interventions post-feed pumping encouraged   Coping/Psychosocial Interventions   Parent/Child Attachment Promotion caring behavior modeled;cue recognition promoted;skin-to-skin contact encouraged;strengths emphasized;positive reinforcement provided   Supportive Measures active listening utilized   Mom reports breastfeeding is going well. Mom wants to get pump tomorrow. Doesn't feel well today. Teaching done, as documented under Education. To call lactation services, if there are questions or concerns or if mom wants help with a feeding.

## 2020-05-04 NOTE — PAYOR COMM NOTE
"Dolores Brito (24 y.o. Female)     Date of Birth Social Security Number Address Home Phone MRN    1995  [address] [facility] [address] [phone] [id number]    Yazdanism Marital Status          [employer]        Admission Date Admission Type Admitting Provider Attending Provider Department, Room/Bed    5/1/20 Elective Margaret Varela MD  [facility] MOTHER BABY 4A, N402/1    Discharge Date Discharge Disposition Discharge Destination        5/3/2020 Home or Self Care              Attending Provider:  (none)   Allergies:  No Known Allergies    Isolation:  None   Infection:  None   Code Status:  Prior    Ht:  160 cm (63\")   Wt:  73.5 kg (162 lb)    Admission Cmt:  None   Principal Problem:  None                Active Insurance as of 5/1/2020     Primary Coverage     Payor Plan Insurance Group Employer/Plan Group    [place] [id number]     Payor Plan Address Payor Plan Phone Number Payor Plan Fax Number Effective Dates    [employer] Claims Office - [address] [address] [phone]  1/1/2020 - None Entered    [facility] [id number]       Subscriber Name Subscriber Birth Date Member ID       DOLORES BRITO 1995 [id number]                 Emergency Contacts      (Rel.) Home Phone Work Phone Mobile Phone    Ab Phyllis Locke (Spouse) [phone] -- [phone]               Discharge Summary      Ben John CNM at 05/03/20 1158          [facility]  Vaginal delivery discharge summary      Patient: Dolores Brito      MR#:[id number]  Admission  Diagnosis:   Patient Active Problem List   Diagnosis   • Gestational hypertension   • Spontaneous vaginal delivery     Discharge Diagnosis:   Gestational hypertension   Spontaneous vaginal delivery         Date of Admission: 5/1/2020  Date of Discharge:  5/3/2020    Procedures:  Vaginal, Spontaneous     5/1/2020    7:10 PM      Service:  Obstetrics    Hospital Course:  Patient underwent vaginal delivery and " remained in the hospital for 2 days.  During that time she remained afebrile and hemodynamically stable.  On the day of discharge, she was eating, ambulating and voiding without difficulty.  She is breastfeeding. She denies HA, vision changes, and epigastric pain.     Labs:    Lab Results   Component Value Date    WBC 10.69 05/02/2020    HGB 11.9 (L) 05/02/2020    HCT 37.4 05/02/2020    MCV 83.1 05/02/2020     05/02/2020    URICACID 7.6 (H) 05/02/2020    AST 34 (H) 05/02/2020    ALT 17 05/02/2020     (H) 05/02/2020     Results from last 7 days   Lab Units 05/01/20  1135   ABO TYPING  A   RH TYPING  Positive   ANTIBODY SCREEN  Negative       Discharge Medications     Discharge Medications      Continue These Medications      Instructions Start Date   ibuprofen 600 MG tablet  Commonly known as:  ADVIL,MOTRIN   600 mg, Oral, Every 6 Hours PRN      Prenatal 27-1 27-1 MG tablet tablet   1 tablet, Oral, Daily         Stop These Medications    amoxicillin 500 MG capsule  Commonly known as:  AMOXIL     ferrous sulfate 325 (65 FE) MG tablet     labetalol 200 MG tablet  Commonly known as:  NORMODYNE     NIFEdipine CC 30 MG 24 hr tablet  Commonly known as:  ADALAT CC            Discharge Disposition:  To Home    Discharge Condition:  Stable    Discharge Diet: Regular    Activity at Discharge: Pelvic rest    Follow-up Appointments  Follow up with [facility] in 1 week with Tele health visit for BP check.      Ben John CNM  05/03/20  11:58    Electronically signed by Margaret Varela MD at 05/03/20 8996

## 2020-05-08 ENCOUNTER — LAB (OUTPATIENT)
Dept: LAB | Facility: HOSPITAL | Age: 25
End: 2020-05-08

## 2020-05-08 ENCOUNTER — TRANSCRIBE ORDERS (OUTPATIENT)
Dept: LAB | Facility: HOSPITAL | Age: 25
End: 2020-05-08

## 2020-05-08 DIAGNOSIS — O14.90 PRE-ECLAMPSIA, ANTEPARTUM: Primary | ICD-10-CM

## 2020-05-08 DIAGNOSIS — O14.90 PRE-ECLAMPSIA, ANTEPARTUM: ICD-10-CM

## 2020-05-08 LAB
ALP SERPL-CCNC: 97 U/L (ref 39–117)
ALT SERPL W P-5'-P-CCNC: 27 U/L (ref 1–33)
AST SERPL-CCNC: 17 U/L (ref 1–32)
BILIRUB SERPL-MCNC: 0.2 MG/DL (ref 0.2–1.2)
CREAT BLD-MCNC: 0.71 MG/DL (ref 0.57–1)
DEPRECATED RDW RBC AUTO: 44 FL (ref 37–54)
ERYTHROCYTE [DISTWIDTH] IN BLOOD BY AUTOMATED COUNT: 15.2 % (ref 12.3–15.4)
HCT VFR BLD AUTO: 38 % (ref 34–46.6)
HGB BLD-MCNC: 12.6 G/DL (ref 12–15.9)
LDH SERPL-CCNC: 228 U/L (ref 135–214)
MCH RBC QN AUTO: 26.7 PG (ref 26.6–33)
MCHC RBC AUTO-ENTMCNC: 33.2 G/DL (ref 31.5–35.7)
MCV RBC AUTO: 80.5 FL (ref 79–97)
PLATELET # BLD AUTO: 338 10*3/MM3 (ref 140–450)
PMV BLD AUTO: 11.8 FL (ref 6–12)
RBC # BLD AUTO: 4.72 10*6/MM3 (ref 3.77–5.28)
URATE SERPL-MCNC: 7.3 MG/DL (ref 2.4–5.7)
WBC NRBC COR # BLD: 7.24 10*3/MM3 (ref 3.4–10.8)

## 2020-05-08 PROCEDURE — 84450 TRANSFERASE (AST) (SGOT): CPT

## 2020-05-08 PROCEDURE — 84550 ASSAY OF BLOOD/URIC ACID: CPT

## 2020-05-08 PROCEDURE — 84075 ASSAY ALKALINE PHOSPHATASE: CPT

## 2020-05-08 PROCEDURE — 85027 COMPLETE CBC AUTOMATED: CPT

## 2020-05-08 PROCEDURE — 84460 ALANINE AMINO (ALT) (SGPT): CPT

## 2020-05-08 PROCEDURE — 82247 BILIRUBIN TOTAL: CPT

## 2020-05-08 PROCEDURE — 83615 LACTATE (LD) (LDH) ENZYME: CPT

## 2020-05-08 PROCEDURE — 82565 ASSAY OF CREATININE: CPT

## 2020-05-08 PROCEDURE — 36415 COLL VENOUS BLD VENIPUNCTURE: CPT

## 2020-08-15 PROCEDURE — U0003 INFECTIOUS AGENT DETECTION BY NUCLEIC ACID (DNA OR RNA); SEVERE ACUTE RESPIRATORY SYNDROME CORONAVIRUS 2 (SARS-COV-2) (CORONAVIRUS DISEASE [COVID-19]), AMPLIFIED PROBE TECHNIQUE, MAKING USE OF HIGH THROUGHPUT TECHNOLOGIES AS DESCRIBED BY CMS-2020-01-R: HCPCS | Performed by: FAMILY MEDICINE

## 2020-08-18 ENCOUNTER — TELEPHONE (OUTPATIENT)
Dept: URGENT CARE | Facility: CLINIC | Age: 25
End: 2020-08-18

## (undated) DEVICE — DRSNG TELFA PAD NONADH STR 1S 3X8IN

## (undated) DEVICE — GLV SURG SENSICARE W/ALOE PF LF 6.5 STRL

## (undated) DEVICE — SYR BULB IRRG DOVER W/PROTECT/CP 60ML

## (undated) DEVICE — LEX VAG DELIVERY PACK: Brand: MEDLINE INDUSTRIES, INC.

## (undated) DEVICE — SOL IRR H2O BTL 1000ML STRL

## (undated) DEVICE — STRAP STIRUP SLP RNG 19X3.5IN DISP

## (undated) DEVICE — LEGGINGS, PAIR, 29X43, STERILE: Brand: MEDLINE

## (undated) DEVICE — ST BALN POSTPARTUM BAKRI 24F 500ML 54CM

## (undated) DEVICE — TRY SPINE BLCK WHITACRE 25G 3X5IN

## (undated) DEVICE — CVR HNDL LT

## (undated) DEVICE — IRRIGATOR BULB ASEPTO 60CC STRL